# Patient Record
Sex: MALE | Race: WHITE | NOT HISPANIC OR LATINO | Employment: OTHER | URBAN - METROPOLITAN AREA
[De-identification: names, ages, dates, MRNs, and addresses within clinical notes are randomized per-mention and may not be internally consistent; named-entity substitution may affect disease eponyms.]

---

## 2017-03-09 ENCOUNTER — OFFICE VISIT (OUTPATIENT)
Dept: FAMILY MEDICINE CLINIC | Facility: CLINIC | Age: 82
End: 2017-03-09

## 2017-03-09 VITALS
HEART RATE: 75 BPM | TEMPERATURE: 97.9 F | RESPIRATION RATE: 16 BRPM | SYSTOLIC BLOOD PRESSURE: 138 MMHG | BODY MASS INDEX: 25.27 KG/M2 | DIASTOLIC BLOOD PRESSURE: 63 MMHG | HEIGHT: 67 IN | WEIGHT: 161 LBS

## 2017-03-09 DIAGNOSIS — E78.2 MIXED HYPERLIPIDEMIA: Primary | ICD-10-CM

## 2017-03-09 DIAGNOSIS — K21.9 GASTROESOPHAGEAL REFLUX DISEASE WITHOUT ESOPHAGITIS: ICD-10-CM

## 2017-03-09 DIAGNOSIS — I10 ESSENTIAL HYPERTENSION: ICD-10-CM

## 2017-03-09 PROCEDURE — 99213 OFFICE O/P EST LOW 20 MIN: CPT | Performed by: FAMILY MEDICINE

## 2017-03-09 NOTE — PROGRESS NOTES
"Subjective   Adriel Mackenzie is a 81 y.o. male.     History of Present Illness     Chief Complaint:   Chief Complaint   Patient presents with   • Hyperlipidemia     DR MCQUEEN PATIENT    • Heartburn     NO RF TODAY PER PT     • Hypertension       Adriel Mackenzie 81 y.o. male who presents today To see me for the first time (former Dr. Mcqueen pt)  for Medical Management of the below listed issues and medication refills.  he has a history of   Patient Active Problem List   Diagnosis   • GERD (gastroesophageal reflux disease)   • Hyperlipidemia   • Pleural effusion   • History of coronary artery stent placement   • Essential hypertension   .  Since the last visit, he has overall felt well.  he has been compliant with   Current Outpatient Prescriptions:   •  albuterol (PROVENTIL HFA;VENTOLIN HFA) 108 (90 BASE) MCG/ACT inhaler, Inhale 2 puffs every 4 (four) hours as needed for wheezing., Disp: , Rfl:   •  aspirin 81 MG tablet, Take 81 mg by mouth every night., Disp: , Rfl:   •  clopidogrel (PLAVIX) 75 MG tablet, Take  by mouth every other day., Disp: , Rfl:   •  esomeprazole (NexIUM) 40 MG capsule, Take 1 capsule by mouth every morning., Disp: , Rfl:   •  metoprolol succinate XL (TOPROL-XL) 25 MG 24 hr tablet, Take 25 mg by mouth daily., Disp: , Rfl:   •  oxaprozin (DAYPRO) 600 MG tablet, Take 1 tablet by mouth every morning., Disp: , Rfl:   •  rosuvastatin (CRESTOR) 5 MG tablet, Take 5 mg by mouth every evening., Disp: , Rfl:   •  SYMBICORT 80-4.5 MCG/ACT inhaler, , Disp: , Rfl: .  he denies medication side effects.    All of the chronic condition(s) listed above are stable w/o issues.      Visit Vitals   • /63   • Pulse 75   • Temp 97.9 °F (36.6 °C) (Oral)   • Resp 16   • Ht 67\" (170.2 cm)   • Wt 161 lb (73 kg)   • BMI 25.22 kg/m2       Results for orders placed or performed during the hospital encounter of 03/01/16   CBC (No diff)   Result Value Ref Range    WBC 4.90 4.50 - 10.70 K/Cumm    RBC 4.28 (L) " 4.60 - 6.00 Million    Hemoglobin 12.2 (L) 13.7 - 17.6 g/dL    Hematocrit 38.5 (L) 40.4 - 52.2 %    MCV 90.0 79.8 - 96.2 fL    MCH 28.5 27.0 - 32.7 pg    MCHC 31.7 (L) 32.6 - 36.4 g/dL    RDW 14.0 11.5 - 14.5 %    Platelets 223 140 - 500 K/Cumm   Protime-INR   Result Value Ref Range    Protime 13.5 11.7 - 14.2 Seconds    INR 1.1 0.9 - 1.1   APTT   Result Value Ref Range    PTT 31.4 22.7 - 35.4 Seconds   Comprehensive metabolic panel   Result Value Ref Range    Potassium 4.0 3.5 - 5.2 mmol/L    Chloride 104 98 - 107 mmol/L    Sodium 140 136 - 145 mmol/L    Glucose 112 (H) 65 - 99 mg/dL    BUN 18 8 - 23 mg/dL    Creatinine 1.00 0.76 - 1.27 mg/dL    CO2 24 22 - 29 mmol/L    Calcium 8.7 8.6 - 10.5 mg/dL    Total Protein 6.2 6.0 - 8.5 g/dL    Albumin 3.7 3.5 - 5.2 g/dL    AST (SGOT) 22 1 - 40 U/L    ALT (SGPT) 20 1 - 41 U/L    Alkaline Phosphatase 63 39 - 117 U/L    Total Bilirubin 0.6 0.1 - 1.2 mg/dL    Est GFR by Clearance 72 ml/min/1.732   Urinalysis   Result Value Ref Range    Color Yellow     Appearance, UA Clear     pH, UA 6.5 5.0 - 8.0    Specific Gravity, UA 1.021 1.005 - 1.030    Glucose, UA Negative Negative    Ketones, UA Negative Negative    Bilirubin, UA Negative Negative    Blood, UA Negative Negative    Protein, UA Negative Negative    Nitrite, UA Negative Negative    Leukocytes, UA Negative Negative    Urobilinogen, UA Normal Normal         The following portions of the patient's history were reviewed and updated as appropriate: allergies, current medications, past family history, past medical history, past social history, past surgical history and problem list.    Review of Systems   Constitutional: Negative for activity change, chills, fatigue and fever.   Respiratory: Negative for cough and chest tightness.    Cardiovascular: Negative for chest pain and palpitations.   Gastrointestinal: Negative for abdominal pain and nausea.   Endocrine: Negative for cold intolerance and polydipsia.    Psychiatric/Behavioral: Negative for behavioral problems and dysphoric mood.   All other systems reviewed and are negative.      Objective   Physical Exam   Constitutional: He appears well-developed and well-nourished.   Neck: Neck supple. No thyromegaly present.   Cardiovascular: Normal rate and regular rhythm.    No murmur heard.  Pulmonary/Chest: Effort normal and breath sounds normal.   Abdominal: Bowel sounds are normal.   Psychiatric: He has a normal mood and affect. His behavior is normal.   Nursing note and vitals reviewed.      Assessment/Plan   Adriel was seen today for hyperlipidemia, heartburn and hypertension.    Diagnoses and all orders for this visit:    Mixed hyperlipidemia  -     Lipid panel    Essential hypertension  -     Comprehensive metabolic panel  -     Lipid panel  -     CBC and Differential  -     TSH    Gastroesophageal reflux disease without esophagitis      Very nice gentleman who will start seeing Michelle going forward.

## 2017-03-14 LAB
ALBUMIN SERPL-MCNC: 4.2 G/DL (ref 3.5–5.2)
ALBUMIN/GLOB SERPL: 1.8 G/DL
ALP SERPL-CCNC: 71 U/L (ref 39–117)
ALT SERPL-CCNC: 22 U/L (ref 1–41)
AST SERPL-CCNC: 29 U/L (ref 1–40)
BASOPHILS # BLD AUTO: 0.03 10*3/MM3 (ref 0–0.2)
BASOPHILS NFR BLD AUTO: 0.6 % (ref 0–1.5)
BILIRUB SERPL-MCNC: 0.8 MG/DL (ref 0.1–1.2)
BUN SERPL-MCNC: 10 MG/DL (ref 8–23)
BUN/CREAT SERPL: 9.5 (ref 7–25)
CALCIUM SERPL-MCNC: 9.2 MG/DL (ref 8.6–10.5)
CHLORIDE SERPL-SCNC: 102 MMOL/L (ref 98–107)
CHOLEST SERPL-MCNC: 121 MG/DL (ref 0–200)
CO2 SERPL-SCNC: 27.7 MMOL/L (ref 22–29)
CREAT SERPL-MCNC: 1.05 MG/DL (ref 0.76–1.27)
EOSINOPHIL # BLD AUTO: 0.44 10*3/MM3 (ref 0–0.7)
EOSINOPHIL NFR BLD AUTO: 9.2 % (ref 0.3–6.2)
ERYTHROCYTE [DISTWIDTH] IN BLOOD BY AUTOMATED COUNT: 13.8 % (ref 11.5–14.5)
GLOBULIN SER CALC-MCNC: 2.3 GM/DL
GLUCOSE SERPL-MCNC: 104 MG/DL (ref 65–99)
HCT VFR BLD AUTO: 42 % (ref 40.4–52.2)
HDLC SERPL-MCNC: 53 MG/DL (ref 40–60)
HGB BLD-MCNC: 13.5 G/DL (ref 13.7–17.6)
IMM GRANULOCYTES # BLD: 0 10*3/MM3 (ref 0–0.03)
IMM GRANULOCYTES NFR BLD: 0 % (ref 0–0.5)
LDLC SERPL CALC-MCNC: 55 MG/DL (ref 0–100)
LYMPHOCYTES # BLD AUTO: 1.39 10*3/MM3 (ref 0.9–4.8)
LYMPHOCYTES NFR BLD AUTO: 29.2 % (ref 19.6–45.3)
MCH RBC QN AUTO: 28.5 PG (ref 27–32.7)
MCHC RBC AUTO-ENTMCNC: 32.1 G/DL (ref 32.6–36.4)
MCV RBC AUTO: 88.8 FL (ref 79.8–96.2)
MONOCYTES # BLD AUTO: 0.4 10*3/MM3 (ref 0.2–1.2)
MONOCYTES NFR BLD AUTO: 8.4 % (ref 5–12)
NEUTROPHILS # BLD AUTO: 2.5 10*3/MM3 (ref 1.9–8.1)
NEUTROPHILS NFR BLD AUTO: 52.6 % (ref 42.7–76)
PLATELET # BLD AUTO: 254 10*3/MM3 (ref 140–500)
POTASSIUM SERPL-SCNC: 4.4 MMOL/L (ref 3.5–5.2)
PROT SERPL-MCNC: 6.5 G/DL (ref 6–8.5)
RBC # BLD AUTO: 4.73 10*6/MM3 (ref 4.6–6)
SODIUM SERPL-SCNC: 142 MMOL/L (ref 136–145)
TRIGL SERPL-MCNC: 67 MG/DL (ref 0–150)
TSH SERPL DL<=0.005 MIU/L-ACNC: 1.86 MIU/ML (ref 0.27–4.2)
VLDLC SERPL CALC-MCNC: 13.4 MG/DL (ref 5–40)
WBC # BLD AUTO: 4.76 10*3/MM3 (ref 4.5–10.7)

## 2017-04-04 ENCOUNTER — OFFICE VISIT (OUTPATIENT)
Dept: FAMILY MEDICINE CLINIC | Facility: CLINIC | Age: 82
End: 2017-04-04

## 2017-04-04 VITALS
HEIGHT: 67 IN | HEART RATE: 74 BPM | OXYGEN SATURATION: 95 % | SYSTOLIC BLOOD PRESSURE: 130 MMHG | TEMPERATURE: 97.7 F | BODY MASS INDEX: 25.43 KG/M2 | RESPIRATION RATE: 16 BRPM | DIASTOLIC BLOOD PRESSURE: 60 MMHG | WEIGHT: 162 LBS

## 2017-04-04 DIAGNOSIS — J42 CHRONIC BRONCHITIS, UNSPECIFIED CHRONIC BRONCHITIS TYPE (HCC): Primary | ICD-10-CM

## 2017-04-04 DIAGNOSIS — E78.2 MIXED HYPERLIPIDEMIA: ICD-10-CM

## 2017-04-04 PROCEDURE — 99213 OFFICE O/P EST LOW 20 MIN: CPT | Performed by: NURSE PRACTITIONER

## 2017-04-04 RX ORDER — ATORVASTATIN CALCIUM 20 MG/1
20 TABLET, FILM COATED ORAL DAILY
Qty: 90 TABLET | Refills: 1 | Status: SHIPPED | OUTPATIENT
Start: 2017-04-04 | End: 2018-04-16

## 2017-04-04 RX ORDER — BUDESONIDE AND FORMOTEROL FUMARATE DIHYDRATE 80; 4.5 UG/1; UG/1
2 AEROSOL RESPIRATORY (INHALATION)
Qty: 1 INHALER | Refills: 5 | Status: SHIPPED | OUTPATIENT
Start: 2017-04-04 | End: 2017-05-17 | Stop reason: SDUPTHER

## 2017-04-04 RX ORDER — ALBUTEROL SULFATE 90 UG/1
2 AEROSOL, METERED RESPIRATORY (INHALATION) EVERY 4 HOURS PRN
Qty: 1 INHALER | Refills: 6 | Status: SHIPPED | OUTPATIENT
Start: 2017-04-04

## 2017-04-04 NOTE — PROGRESS NOTES
"Subjective   Adriel Mackenzie is a 81 y.o. male.     History of Present Illness   Chief Complaint:   Chief Complaint   Patient presents with   • lab results       Adriel Mackenzie 81 y.o. male who presents today for Medical Management of the below listed issues and medication refills.  he has a history of   Patient Active Problem List   Diagnosis   • GERD (gastroesophageal reflux disease)   • Hyperlipidemia   • Pleural effusion   • History of coronary artery stent placement   • Essential hypertension   .  Since the last visit, he has overall felt well.  he has been compliant with   Current Outpatient Prescriptions:   •  albuterol (PROVENTIL HFA;VENTOLIN HFA) 108 (90 BASE) MCG/ACT inhaler, Inhale 2 puffs Every 4 (Four) Hours As Needed for Wheezing., Disp: 1 inhaler, Rfl: 6  •  aspirin 81 MG tablet, Take 81 mg by mouth every night., Disp: , Rfl:   •  atorvastatin (LIPITOR) 20 MG tablet, Take 1 tablet by mouth Daily., Disp: 90 tablet, Rfl: 1  •  budesonide-formoterol (SYMBICORT) 80-4.5 MCG/ACT inhaler, Inhale 2 puffs 2 (Two) Times a Day., Disp: 1 inhaler, Rfl: 5  •  clopidogrel (PLAVIX) 75 MG tablet, Take  by mouth every other day., Disp: , Rfl:   •  esomeprazole (NexIUM) 40 MG capsule, Take 1 capsule by mouth every morning., Disp: , Rfl:   •  ipratropium-albuterol (COMBIVENT RESPIMAT)  MCG/ACT inhaler, Inhale 1 puff 4 (Four) Times a Day As Needed for Wheezing., Disp: 4 g, Rfl: 5  •  metoprolol succinate XL (TOPROL-XL) 25 MG 24 hr tablet, Take 25 mg by mouth daily., Disp: , Rfl:   •  oxaprozin (DAYPRO) 600 MG tablet, Take 1 tablet by mouth every morning., Disp: , Rfl:   •  SYMBICORT 80-4.5 MCG/ACT inhaler, , Disp: , Rfl: .  he denies medication side effects.    All of the chronic condition(s) listed above are stable w/o issues.    /60  Pulse 74  Temp 97.7 °F (36.5 °C)  Resp 16  Ht 67\" (170.2 cm)  Wt 162 lb (73.5 kg)  SpO2 95%  BMI 25.37 kg/m2    Results for orders placed or performed in visit " on 03/09/17   Comprehensive metabolic panel   Result Value Ref Range    Glucose 104 (H) 65 - 99 mg/dL    BUN 10 8 - 23 mg/dL    Creatinine 1.05 0.76 - 1.27 mg/dL    eGFR Non African Am 68 >60 mL/min/1.73    eGFR African Am 82 >60 mL/min/1.73    BUN/Creatinine Ratio 9.5 7.0 - 25.0    Sodium 142 136 - 145 mmol/L    Potassium 4.4 3.5 - 5.2 mmol/L    Chloride 102 98 - 107 mmol/L    Total CO2 27.7 22.0 - 29.0 mmol/L    Calcium 9.2 8.6 - 10.5 mg/dL    Total Protein 6.5 6.0 - 8.5 g/dL    Albumin 4.20 3.50 - 5.20 g/dL    Globulin 2.3 gm/dL    A/G Ratio 1.8 g/dL    Total Bilirubin 0.8 0.1 - 1.2 mg/dL    Alkaline Phosphatase 71 39 - 117 U/L    AST (SGOT) 29 1 - 40 U/L    ALT (SGPT) 22 1 - 41 U/L   Lipid panel   Result Value Ref Range    Total Cholesterol 121 0 - 200 mg/dL    Triglycerides 67 0 - 150 mg/dL    HDL Cholesterol 53 40 - 60 mg/dL    VLDL Cholesterol 13.4 5 - 40 mg/dL    LDL Cholesterol  55 0 - 100 mg/dL   TSH   Result Value Ref Range    TSH 1.860 0.270 - 4.200 mIU/mL   CBC and Differential   Result Value Ref Range    WBC 4.76 4.50 - 10.70 10*3/mm3    RBC 4.73 4.60 - 6.00 10*6/mm3    Hemoglobin 13.5 (L) 13.7 - 17.6 g/dL    Hematocrit 42.0 40.4 - 52.2 %    MCV 88.8 79.8 - 96.2 fL    MCH 28.5 27.0 - 32.7 pg    MCHC 32.1 (L) 32.6 - 36.4 g/dL    RDW 13.8 11.5 - 14.5 %    Platelets 254 140 - 500 10*3/mm3    Neutrophil Rel % 52.6 42.7 - 76.0 %    Lymphocyte Rel % 29.2 19.6 - 45.3 %    Monocyte Rel % 8.4 5.0 - 12.0 %    Eosinophil Rel % 9.2 (H) 0.3 - 6.2 %    Basophil Rel % 0.6 0.0 - 1.5 %    Neutrophils Absolute 2.50 1.90 - 8.10 10*3/mm3    Lymphocytes Absolute 1.39 0.90 - 4.80 10*3/mm3    Monocytes Absolute 0.40 0.20 - 1.20 10*3/mm3    Eosinophils Absolute 0.44 0.00 - 0.70 10*3/mm3    Basophils Absolute 0.03 0.00 - 0.20 10*3/mm3    Immature Granulocyte Rel % 0.0 0.0 - 0.5 %    Immature Grans Absolute 0.00 0.00 - 0.03 10*3/mm3     Patient sees pulmonologist for management of COPD. States he was seen last month but did not  get medications refilled.    He has coronary artery stent and sees cardiology regularly.   The following portions of the patient's history were reviewed and updated as appropriate: allergies, current medications, past family history, past medical history, past social history, past surgical history and problem list.    Review of Systems   Constitutional: Negative for fatigue.   Respiratory: Negative for cough and shortness of breath.    Cardiovascular: Negative for chest pain and palpitations.   Skin: Negative for rash.   Psychiatric/Behavioral: Negative for dysphoric mood and sleep disturbance. The patient is not nervous/anxious.        Objective   Physical Exam   Constitutional: He is oriented to person, place, and time. He appears well-developed and well-nourished.   Neck: Carotid bruit is not present.   Cardiovascular: Normal rate and regular rhythm.    Pulmonary/Chest: Effort normal and breath sounds normal.   Neurological: He is oriented to person, place, and time.   Skin: Skin is warm and dry.   Psychiatric: He has a normal mood and affect. His behavior is normal. Judgment and thought content normal.   Nursing note and vitals reviewed.      Assessment/Plan   Adriel was seen today for lab results.    Diagnoses and all orders for this visit:    Chronic bronchitis, unspecified chronic bronchitis type  -     albuterol (PROVENTIL HFA;VENTOLIN HFA) 108 (90 BASE) MCG/ACT inhaler; Inhale 2 puffs Every 4 (Four) Hours As Needed for Wheezing.  -     ipratropium-albuterol (COMBIVENT RESPIMAT)  MCG/ACT inhaler; Inhale 1 puff 4 (Four) Times a Day As Needed for Wheezing.  -     budesonide-formoterol (SYMBICORT) 80-4.5 MCG/ACT inhaler; Inhale 2 puffs 2 (Two) Times a Day.    Mixed hyperlipidemia  -     atorvastatin (LIPITOR) 20 MG tablet; Take 1 tablet by mouth Daily.

## 2017-04-18 ENCOUNTER — OFFICE VISIT (OUTPATIENT)
Dept: FAMILY MEDICINE CLINIC | Facility: CLINIC | Age: 82
End: 2017-04-18

## 2017-04-18 VITALS
TEMPERATURE: 98.2 F | SYSTOLIC BLOOD PRESSURE: 146 MMHG | RESPIRATION RATE: 18 BRPM | OXYGEN SATURATION: 98 % | WEIGHT: 162 LBS | DIASTOLIC BLOOD PRESSURE: 68 MMHG | BODY MASS INDEX: 25.43 KG/M2 | HEIGHT: 67 IN | HEART RATE: 75 BPM

## 2017-04-18 DIAGNOSIS — J06.9 ACUTE URI: ICD-10-CM

## 2017-04-18 DIAGNOSIS — J02.9 SORE THROAT: Primary | ICD-10-CM

## 2017-04-18 DIAGNOSIS — I10 ESSENTIAL HYPERTENSION: ICD-10-CM

## 2017-04-18 LAB
EXPIRATION DATE: NORMAL
INTERNAL CONTROL: NORMAL
Lab: NORMAL
S PYO AG THROAT QL: NEGATIVE

## 2017-04-18 PROCEDURE — 87880 STREP A ASSAY W/OPTIC: CPT | Performed by: NURSE PRACTITIONER

## 2017-04-18 PROCEDURE — 99214 OFFICE O/P EST MOD 30 MIN: CPT | Performed by: NURSE PRACTITIONER

## 2017-04-18 RX ORDER — AMOXICILLIN AND CLAVULANATE POTASSIUM 875; 125 MG/1; MG/1
1 TABLET, FILM COATED ORAL 2 TIMES DAILY
Qty: 14 TABLET | Refills: 0 | Status: SHIPPED | OUTPATIENT
Start: 2017-04-18 | End: 2017-04-25

## 2017-04-18 NOTE — PROGRESS NOTES
Subjective   Adriel Mackenzie is a 81 y.o. male.     History of Present Illness   Adriel aMckenzie 81 y.o. male who presents for evaluation of upper respiratory congestion, sore throat. Symptoms include sore throat and productive cough.  Onset of symptoms was 2 days ago, gradually worsening since that time. Patient denies fever.   Evaluation to date: none Treatment to date:  none.       Patient notes blood pressure has been elevated in 150s systolic when he has checked it at the gym.  Initial blood pressure elevated today.  Repeat blood pressure 146/68.  The following portions of the patient's history were reviewed and updated as appropriate: allergies, current medications, past family history, past medical history, past social history, past surgical history and problem list.    Review of Systems   Constitutional: Negative for chills, fatigue and fever.   HENT: Positive for sore throat. Negative for ear pain.    Respiratory: Positive for cough. Negative for shortness of breath.    Cardiovascular: Negative for chest pain and palpitations.   Skin: Negative for rash.   Psychiatric/Behavioral: Negative for dysphoric mood and sleep disturbance. The patient is not nervous/anxious.        Objective   Physical Exam   Constitutional: He is oriented to person, place, and time. He appears well-developed and well-nourished.   HENT:   Right Ear: Tympanic membrane, external ear and ear canal normal.   Left Ear: Tympanic membrane, external ear and ear canal normal.   Mouth/Throat: Uvula is midline and oropharynx is clear and moist.   Cardiovascular: Normal rate and regular rhythm.    Pulmonary/Chest: Effort normal and breath sounds normal.   Neurological: He is oriented to person, place, and time.   Skin: Skin is warm and dry.   Psychiatric: He has a normal mood and affect. His behavior is normal. Judgment and thought content normal.   Nursing note and vitals reviewed.      Assessment/Plan   Adriel was seen today for sore  throat.    Diagnoses and all orders for this visit:    Sore throat  -     POCT rapid strep A  -     amoxicillin-clavulanate (AUGMENTIN) 875-125 MG per tablet; Take 1 tablet by mouth 2 (Two) Times a Day for 7 days.    Acute URI  -     amoxicillin-clavulanate (AUGMENTIN) 875-125 MG per tablet; Take 1 tablet by mouth 2 (Two) Times a Day for 7 days.    Essential hypertension          Patient to increase metoprolol to 50 mg daily and f/u in one week.

## 2017-04-20 ENCOUNTER — OFFICE VISIT (OUTPATIENT)
Dept: FAMILY MEDICINE CLINIC | Facility: CLINIC | Age: 82
End: 2017-04-20

## 2017-04-20 VITALS
OXYGEN SATURATION: 94 % | DIASTOLIC BLOOD PRESSURE: 76 MMHG | BODY MASS INDEX: 25.43 KG/M2 | HEIGHT: 67 IN | SYSTOLIC BLOOD PRESSURE: 142 MMHG | HEART RATE: 79 BPM | RESPIRATION RATE: 18 BRPM | WEIGHT: 162 LBS | TEMPERATURE: 98.7 F

## 2017-04-20 DIAGNOSIS — J18.9 PNEUMONIA DUE TO INFECTIOUS ORGANISM, UNSPECIFIED LATERALITY, UNSPECIFIED PART OF LUNG: Primary | ICD-10-CM

## 2017-04-20 PROCEDURE — 99213 OFFICE O/P EST LOW 20 MIN: CPT | Performed by: NURSE PRACTITIONER

## 2017-04-20 RX ORDER — DOXYCYCLINE 100 MG/1
100 TABLET ORAL 2 TIMES DAILY
Qty: 20 TABLET | Refills: 0 | Status: SHIPPED | OUTPATIENT
Start: 2017-04-20 | End: 2017-11-07

## 2017-04-20 NOTE — PROGRESS NOTES
Subjective   Adriel Mackenzie is a 81 y.o. male.     History of Present Illness   Adriel Mackenzie 81 y.o. male who presents for f/u evaluation of URI. Symptoms include sore throat, productive cough and chills.  Onset of symptoms was several days ago, gradually worsening since that time. Patient denies fever.   Evaluation to date: seen recently by me Treatment to date:  Augmentin. Reports feeling worse since 2 days ago.     The following portions of the patient's history were reviewed and updated as appropriate: allergies, current medications, past family history, past medical history, past social history, past surgical history and problem list.    Review of Systems   Constitutional: Positive for chills. Negative for fever.   HENT: Negative for congestion.    Respiratory: Positive for cough, chest tightness and shortness of breath.        Objective   Physical Exam   Constitutional: He is oriented to person, place, and time. He appears well-developed and well-nourished.   Cardiovascular: Normal rate and regular rhythm.    Pulmonary/Chest: Effort normal. He has rales in the right lower field.   Neurological: He is oriented to person, place, and time.   Skin: Skin is warm and dry.   Psychiatric: He has a normal mood and affect. His behavior is normal. Judgment and thought content normal.   Nursing note and vitals reviewed.      Assessment/Plan   Problems Addressed this Visit     None      Visit Diagnoses     Pneumonia due to infectious organism, unspecified laterality, unspecified part of lung    -  Primary    Relevant Medications    doxycycline (ADOXA) 100 MG tablet

## 2017-04-25 ENCOUNTER — OFFICE VISIT (OUTPATIENT)
Dept: FAMILY MEDICINE CLINIC | Facility: CLINIC | Age: 82
End: 2017-04-25

## 2017-04-25 VITALS
WEIGHT: 162 LBS | BODY MASS INDEX: 25.43 KG/M2 | RESPIRATION RATE: 18 BRPM | HEIGHT: 67 IN | DIASTOLIC BLOOD PRESSURE: 80 MMHG | OXYGEN SATURATION: 95 % | HEART RATE: 80 BPM | SYSTOLIC BLOOD PRESSURE: 160 MMHG | TEMPERATURE: 97.8 F

## 2017-04-25 DIAGNOSIS — R93.89 ABNORMAL CHEST X-RAY: ICD-10-CM

## 2017-04-25 DIAGNOSIS — J18.9 PNEUMONIA DUE TO INFECTIOUS ORGANISM, UNSPECIFIED LATERALITY, UNSPECIFIED PART OF LUNG: Primary | ICD-10-CM

## 2017-04-25 PROCEDURE — 99214 OFFICE O/P EST MOD 30 MIN: CPT | Performed by: NURSE PRACTITIONER

## 2017-04-25 PROCEDURE — 71020 XR CHEST PA AND LATERAL: CPT | Performed by: NURSE PRACTITIONER

## 2017-04-25 RX ORDER — ROSUVASTATIN CALCIUM 5 MG/1
TABLET, COATED ORAL
Refills: 2 | COMMUNITY
Start: 2017-04-20

## 2017-04-25 NOTE — PROGRESS NOTES
Subjective   Adriel Mackenzie is a 81 y.o. male.     History of Present Illness   Patient presents to office for f/u of pneumonia.  At last visit, he had doxycycline added to augmentin.  He reports some improvement but is still unable to sleep at night due to cough.  He still has 5 days of doxycycline left.  He does have pulmonologist he sees every 6 months, but is not due for f/u for another 4 months. He had chest xray a year ago at [facility].   The following portions of the patient's history were reviewed and updated as appropriate: allergies, current medications, past family history, past medical history, past social history, past surgical history and problem list.    Review of Systems   Constitutional: Negative for chills and fever.   HENT: Negative for congestion.    Respiratory: Positive for cough, chest tightness and shortness of breath.    Cardiovascular: Negative for chest pain.       Objective   Physical Exam   Constitutional: He is oriented to person, place, and time. He appears well-developed and well-nourished.   Cardiovascular: Normal rate and regular rhythm.    Pulmonary/Chest: Effort normal. He has rales in the right lower field.   Rales improved since last visit   Neurological: He is oriented to person, place, and time.   Skin: Skin is warm and dry.   Psychiatric: He has a normal mood and affect. His behavior is normal. Judgment and thought content normal.   Nursing note and vitals reviewed.      Assessment/Plan   Adriel was seen today for cough.    Diagnoses and all orders for this visit:    Pneumonia due to infectious organism, unspecified laterality, unspecified part of lung  -     XR Chest PA & Lateral (In Office)  -     CT Chest With Contrast    Abnormal chest x-ray        Chest xray showed opacity to LLL and has vertical linear opacity extending from left upper to lower lobe. Similar to previous xray from 3/2016.  Will order CT scan.

## 2017-05-01 ENCOUNTER — OFFICE VISIT (OUTPATIENT)
Dept: FAMILY MEDICINE CLINIC | Facility: CLINIC | Age: 82
End: 2017-05-01

## 2017-05-01 VITALS
DIASTOLIC BLOOD PRESSURE: 80 MMHG | WEIGHT: 160 LBS | TEMPERATURE: 97.8 F | SYSTOLIC BLOOD PRESSURE: 152 MMHG | HEART RATE: 61 BPM | BODY MASS INDEX: 25.11 KG/M2 | HEIGHT: 67 IN | RESPIRATION RATE: 16 BRPM | OXYGEN SATURATION: 96 %

## 2017-05-01 DIAGNOSIS — J40 BRONCHITIS: Primary | ICD-10-CM

## 2017-05-01 PROCEDURE — 99213 OFFICE O/P EST LOW 20 MIN: CPT | Performed by: NURSE PRACTITIONER

## 2017-05-01 RX ORDER — ICOSAPENT ETHYL 1000 MG/1
CAPSULE ORAL
COMMUNITY
Start: 2017-04-26 | End: 2018-08-18

## 2017-05-01 RX ORDER — PREDNISONE 20 MG/1
20 TABLET ORAL 2 TIMES DAILY
Qty: 14 TABLET | Refills: 0 | Status: SHIPPED | OUTPATIENT
Start: 2017-05-01 | End: 2017-05-08

## 2017-05-17 ENCOUNTER — TELEPHONE (OUTPATIENT)
Dept: FAMILY MEDICINE CLINIC | Facility: CLINIC | Age: 82
End: 2017-05-17

## 2017-05-17 DIAGNOSIS — J42 CHRONIC BRONCHITIS, UNSPECIFIED CHRONIC BRONCHITIS TYPE (HCC): ICD-10-CM

## 2017-05-17 RX ORDER — BUDESONIDE AND FORMOTEROL FUMARATE DIHYDRATE 80; 4.5 UG/1; UG/1
2 AEROSOL RESPIRATORY (INHALATION)
Qty: 1 INHALER | Refills: 5 | Status: SHIPPED | OUTPATIENT
Start: 2017-05-17

## 2017-11-07 ENCOUNTER — OFFICE VISIT (OUTPATIENT)
Dept: FAMILY MEDICINE CLINIC | Facility: CLINIC | Age: 82
End: 2017-11-07

## 2017-11-07 VITALS
SYSTOLIC BLOOD PRESSURE: 157 MMHG | TEMPERATURE: 98.4 F | RESPIRATION RATE: 16 BRPM | BODY MASS INDEX: 25.43 KG/M2 | HEIGHT: 67 IN | HEART RATE: 68 BPM | DIASTOLIC BLOOD PRESSURE: 70 MMHG | WEIGHT: 162 LBS

## 2017-11-07 DIAGNOSIS — J40 BRONCHITIS: Primary | ICD-10-CM

## 2017-11-07 PROCEDURE — 99213 OFFICE O/P EST LOW 20 MIN: CPT | Performed by: NURSE PRACTITIONER

## 2017-11-07 RX ORDER — LEVOFLOXACIN 500 MG/1
500 TABLET, FILM COATED ORAL DAILY
Qty: 10 TABLET | Refills: 0 | Status: SHIPPED | OUTPATIENT
Start: 2017-11-07 | End: 2017-11-16

## 2017-11-07 RX ORDER — PREDNISONE 20 MG/1
20 TABLET ORAL 2 TIMES DAILY
Qty: 10 TABLET | Refills: 0 | Status: SHIPPED | OUTPATIENT
Start: 2017-11-07 | End: 2017-11-12

## 2017-11-07 RX ORDER — BENZONATATE 200 MG/1
200 CAPSULE ORAL 3 TIMES DAILY PRN
Qty: 30 CAPSULE | Refills: 0 | Status: SHIPPED | OUTPATIENT
Start: 2017-11-07 | End: 2017-11-16

## 2017-11-07 NOTE — PROGRESS NOTES
Subjective   Adriel Mackenzie is a 82 y.o. male.     History of Present Illness   Adriel Mackenzie 82 y.o. male who presents for evaluation of upper respiratory congestion. Symptoms include congestion, sore throat, post nasal drip, productive cough and chills.  Onset of symptoms was 5 days ago, gradually worsening since that time. Patient denies fever.   Evaluation to date: none Treatment to date:  OTC oral decongestants, OTC cough suppressant and Advil.     The following portions of the patient's history were reviewed and updated as appropriate: allergies, current medications, past family history, past medical history, past social history, past surgical history and problem list.    Review of Systems   Constitutional: Negative for chills and fever.   HENT: Positive for congestion, postnasal drip, sinus pain, sinus pressure and sore throat.    Respiratory: Positive for cough. Negative for chest tightness and shortness of breath.        Objective   Physical Exam   Constitutional: He is oriented to person, place, and time. He appears well-developed and well-nourished.   Cardiovascular: Normal rate and regular rhythm.    Pulmonary/Chest: Effort normal. He has decreased breath sounds in the right lower field and the left lower field. He has wheezes in the right lower field.   Neurological: He is oriented to person, place, and time.   Skin: Skin is warm and dry.   Psychiatric: He has a normal mood and affect. His behavior is normal. Judgment and thought content normal.   Nursing note and vitals reviewed.      Assessment/Plan   Adriel was seen today for sore throat, cough and uri.    Diagnoses and all orders for this visit:    Bronchitis  -     levoFLOXacin (LEVAQUIN) 500 MG tablet; Take 1 tablet by mouth Daily.  -     predniSONE (DELTASONE) 20 MG tablet; Take 1 tablet by mouth 2 (Two) Times a Day for 5 days.  -     benzonatate (TESSALON) 200 MG capsule; Take 1 capsule by mouth 3 (Three) Times a Day As Needed for  Cough.

## 2017-11-16 ENCOUNTER — OFFICE VISIT (OUTPATIENT)
Dept: FAMILY MEDICINE CLINIC | Facility: CLINIC | Age: 82
End: 2017-11-16

## 2017-11-16 VITALS
BODY MASS INDEX: 24.64 KG/M2 | HEART RATE: 76 BPM | DIASTOLIC BLOOD PRESSURE: 86 MMHG | WEIGHT: 157 LBS | HEIGHT: 67 IN | RESPIRATION RATE: 16 BRPM | TEMPERATURE: 98 F | OXYGEN SATURATION: 99 % | SYSTOLIC BLOOD PRESSURE: 130 MMHG

## 2017-11-16 DIAGNOSIS — B37.0 THRUSH: Primary | ICD-10-CM

## 2017-11-16 DIAGNOSIS — J40 BRONCHITIS: ICD-10-CM

## 2017-11-16 PROCEDURE — 99213 OFFICE O/P EST LOW 20 MIN: CPT | Performed by: NURSE PRACTITIONER

## 2017-11-16 RX ORDER — LEVOFLOXACIN 500 MG/1
500 TABLET, FILM COATED ORAL DAILY
Qty: 7 TABLET | Refills: 0 | Status: SHIPPED | OUTPATIENT
Start: 2017-11-16 | End: 2017-12-01 | Stop reason: SDUPTHER

## 2017-11-16 NOTE — PROGRESS NOTES
Subjective   Adriel Mackenzie is a 82 y.o. male.     History of Present Illness   Adriel Mackenzie 82 y.o. male who presents for f/u evaluation of bronchitis. He finished the levaquin and states symptoms improved but started to return when he finished antibiotic.  He reports sore throat and nonproductive cough.    The following portions of the patient's history were reviewed and updated as appropriate: allergies, current medications, past family history, past medical history, past social history, past surgical history and problem list.    Review of Systems   Constitutional: Negative for chills and fever.   HENT: Positive for sore throat and trouble swallowing.    Respiratory: Positive for cough.        Objective   Physical Exam   Constitutional: He is oriented to person, place, and time. He appears well-developed and well-nourished.   HENT:   Thick, creamy white coating to tongue   Cardiovascular: Normal rate and regular rhythm.    Pulmonary/Chest: Effort normal. He has decreased breath sounds in the right lower field and the left lower field.   Neurological: He is oriented to person, place, and time.   Skin: Skin is warm and dry.   Psychiatric: He has a normal mood and affect. His behavior is normal. Judgment and thought content normal.   Nursing note and vitals reviewed.      Assessment/Plan   Adriel was seen today for sore throat and cough.    Diagnoses and all orders for this visit:    Thrush  -     nystatin (MYCOSTATIN) 263898 UNIT/ML suspension; Swish and swallow 5 mL 4 (Four) Times a Day.    Bronchitis  -     levoFLOXacin (LEVAQUIN) 500 MG tablet; Take 1 tablet by mouth Daily.

## 2017-12-01 ENCOUNTER — OFFICE VISIT (OUTPATIENT)
Dept: FAMILY MEDICINE CLINIC | Facility: CLINIC | Age: 82
End: 2017-12-01

## 2017-12-01 VITALS
OXYGEN SATURATION: 95 % | RESPIRATION RATE: 18 BRPM | HEART RATE: 65 BPM | WEIGHT: 161 LBS | HEIGHT: 67 IN | SYSTOLIC BLOOD PRESSURE: 150 MMHG | DIASTOLIC BLOOD PRESSURE: 86 MMHG | TEMPERATURE: 97.8 F | BODY MASS INDEX: 25.27 KG/M2

## 2017-12-01 DIAGNOSIS — J06.9 ACUTE URI: Primary | ICD-10-CM

## 2017-12-01 PROCEDURE — 99213 OFFICE O/P EST LOW 20 MIN: CPT | Performed by: NURSE PRACTITIONER

## 2017-12-01 RX ORDER — LEVOFLOXACIN 500 MG/1
500 TABLET, FILM COATED ORAL DAILY
Qty: 10 TABLET | Refills: 0 | Status: SHIPPED | OUTPATIENT
Start: 2017-12-01 | End: 2018-04-16

## 2017-12-01 NOTE — PROGRESS NOTES
Subjective   Adriel Mackenzie is a 82 y.o. male.     History of Present Illness   Adriel Mackenzie 82 y.o. male who presents for evaluation of upper respiratory congestion. Symptoms include congestion, sore throat, swollen glands and dry cough.  Onset of symptoms was a few days ago, gradually improving since that time. Patient denies shortness of breath, wheezing, fever.   Evaluation to date: none Treatment to date:  none.     The following portions of the patient's history were reviewed and updated as appropriate: allergies, current medications, past family history, past medical history, past social history, past surgical history and problem list.    Review of Systems   Constitutional: Negative for chills and fever.   HENT: Positive for congestion, postnasal drip, rhinorrhea and sore throat. Negative for sinus pressure.    Respiratory: Positive for cough.        Objective   Physical Exam   Constitutional: He is oriented to person, place, and time. He appears well-developed and well-nourished.   HENT:   Right Ear: Tympanic membrane, external ear and ear canal normal.   Left Ear: Tympanic membrane, external ear and ear canal normal.   Nose: Right sinus exhibits no maxillary sinus tenderness and no frontal sinus tenderness. Left sinus exhibits no maxillary sinus tenderness and no frontal sinus tenderness.   Mouth/Throat: Uvula is midline and oropharynx is clear and moist.   Cardiovascular: Normal rate and regular rhythm.    Pulmonary/Chest: Effort normal and breath sounds normal.   Neurological: He is oriented to person, place, and time.   Skin: Skin is warm and dry.   Psychiatric: He has a normal mood and affect. His behavior is normal. Judgment and thought content normal.   Nursing note and vitals reviewed.      Assessment/Plan   Adriel was seen today for sore throat.    Diagnoses and all orders for this visit:    Acute URI  -     levoFLOXacin (LEVAQUIN) 500 MG tablet; Take 1 tablet by mouth  Daily.        Patient not to take antibiotic unless cough and chest congestion worsens.

## 2018-04-16 ENCOUNTER — OFFICE VISIT (OUTPATIENT)
Dept: FAMILY MEDICINE CLINIC | Facility: CLINIC | Age: 83
End: 2018-04-16

## 2018-04-16 VITALS
BODY MASS INDEX: 25.11 KG/M2 | RESPIRATION RATE: 16 BRPM | SYSTOLIC BLOOD PRESSURE: 146 MMHG | HEART RATE: 64 BPM | HEIGHT: 67 IN | TEMPERATURE: 98.1 F | OXYGEN SATURATION: 96 % | WEIGHT: 160 LBS | DIASTOLIC BLOOD PRESSURE: 56 MMHG

## 2018-04-16 DIAGNOSIS — J40 BRONCHITIS: Primary | ICD-10-CM

## 2018-04-16 PROCEDURE — 99213 OFFICE O/P EST LOW 20 MIN: CPT | Performed by: NURSE PRACTITIONER

## 2018-04-16 RX ORDER — AMLODIPINE BESYLATE 5 MG/1
5 TABLET ORAL
COMMUNITY
Start: 2018-03-23

## 2018-04-16 RX ORDER — PREDNISONE 20 MG/1
TABLET ORAL
Qty: 20 TABLET | Refills: 0 | Status: SHIPPED | OUTPATIENT
Start: 2018-04-16 | End: 2018-08-18

## 2018-04-16 RX ORDER — LEVOFLOXACIN 500 MG/1
500 TABLET, FILM COATED ORAL DAILY
Qty: 14 TABLET | Refills: 0 | Status: SHIPPED | OUTPATIENT
Start: 2018-04-16 | End: 2018-04-26

## 2018-04-16 NOTE — PROGRESS NOTES
Subjective   Adriel Mackenzie is a 82 y.o. male.     History of Present Illness   Adriel Mackenzie 82 y.o. male who presents for evaluation of upper respiratory congestion, acute bronchitis. Symptoms include congestion, nasal blockage and productive cough, wheezing and shortness of breath.  Onset of symptoms was 5 days ago, gradually worsening since that time. Patient denies fever.   Evaluation to date: none Treatment to date:  leftover levaquin which has helped.  Only had 3 doses.     The following portions of the patient's history were reviewed and updated as appropriate: allergies, current medications, past family history, past medical history, past social history, past surgical history and problem list.    Review of Systems   Constitutional: Positive for fatigue. Negative for chills and fever.   HENT: Positive for congestion and rhinorrhea.    Respiratory: Positive for cough, chest tightness, shortness of breath and wheezing.        Objective   Physical Exam   Constitutional: He is oriented to person, place, and time. He appears well-developed and well-nourished.   HENT:   Right Ear: Tympanic membrane, external ear and ear canal normal.   Left Ear: Tympanic membrane, external ear and ear canal normal.   Nose: Right sinus exhibits no maxillary sinus tenderness and no frontal sinus tenderness. Left sinus exhibits no maxillary sinus tenderness and no frontal sinus tenderness.   Mouth/Throat: Uvula is midline and oropharynx is clear and moist.   Cardiovascular: Normal rate and regular rhythm.    Pulmonary/Chest: Effort normal. He has wheezes in the right upper field, the right lower field, the left upper field and the left lower field. He has rhonchi in the right upper field, the right lower field, the left upper field and the left lower field.   Neurological: He is oriented to person, place, and time.   Skin: Skin is warm and dry.   Psychiatric: He has a normal mood and affect. His behavior is normal.  Judgment and thought content normal.   Nursing note and vitals reviewed.      Assessment/Plan   Adriel was seen today for cough and uri.    Diagnoses and all orders for this visit:    Bronchitis  -     levoFLOXacin (LEVAQUIN) 500 MG tablet; Take 1 tablet by mouth Daily for 10 days.  -     predniSONE (DELTASONE) 20 MG tablet; Take 3 tabs QDPC x 3 days then 2 tabs QDPC x 4 days then 1 tab QDPC x 3 days

## 2018-08-16 ENCOUNTER — OFFICE VISIT (OUTPATIENT)
Dept: FAMILY MEDICINE CLINIC | Facility: CLINIC | Age: 83
End: 2018-08-16

## 2018-08-16 VITALS
HEIGHT: 67 IN | WEIGHT: 158 LBS | DIASTOLIC BLOOD PRESSURE: 62 MMHG | TEMPERATURE: 97.5 F | OXYGEN SATURATION: 97 % | HEART RATE: 60 BPM | RESPIRATION RATE: 16 BRPM | BODY MASS INDEX: 24.8 KG/M2 | SYSTOLIC BLOOD PRESSURE: 140 MMHG

## 2018-08-16 DIAGNOSIS — J40 BRONCHITIS: Primary | ICD-10-CM

## 2018-08-16 PROCEDURE — 99213 OFFICE O/P EST LOW 20 MIN: CPT | Performed by: NURSE PRACTITIONER

## 2018-08-16 RX ORDER — LEVOFLOXACIN 500 MG/1
500 TABLET, FILM COATED ORAL DAILY
Qty: 10 TABLET | Refills: 0 | Status: SHIPPED | OUTPATIENT
Start: 2018-08-16 | End: 2018-08-21 | Stop reason: HOSPADM

## 2018-08-16 NOTE — PROGRESS NOTES
Subjective   Adriel Mackenzie is a 82 y.o. male.     History of Present Illness   Adriel Mackenzie 82 y.o. male who presents for evaluation of cough. Symptoms include productive cough, exertional SOA and chest congestion.  Onset of symptoms was 1 week ago, gradually worsening since that time. Patient denies fever.   Evaluation to date: none Treatment to date:  Mucinex. Hx of COPD.     The following portions of the patient's history were reviewed and updated as appropriate: allergies, current medications, past family history, past medical history, past social history, past surgical history and problem list.    Review of Systems   Constitutional: Negative for chills and fever.   HENT: Negative for congestion, ear pain, sinus pressure and sore throat.    Respiratory: Positive for cough, chest tightness, shortness of breath and wheezing.        Objective   Physical Exam   Constitutional: He is oriented to person, place, and time. He appears well-developed and well-nourished.   HENT:   Right Ear: Tympanic membrane, external ear and ear canal normal.   Left Ear: Tympanic membrane, external ear and ear canal normal.   Nose: Right sinus exhibits no maxillary sinus tenderness and no frontal sinus tenderness. Left sinus exhibits no maxillary sinus tenderness and no frontal sinus tenderness.   Mouth/Throat: Uvula is midline and oropharynx is clear and moist.   Cardiovascular: Normal rate and regular rhythm.    Pulmonary/Chest: Effort normal. He has decreased breath sounds in the right lower field and the left lower field.   Neurological: He is oriented to person, place, and time.   Skin: Skin is warm and dry.   Psychiatric: He has a normal mood and affect. His behavior is normal. Judgment and thought content normal.   Nursing note and vitals reviewed.      Assessment/Plan   Adriel was seen today for uri.    Diagnoses and all orders for this visit:    Bronchitis  -     levoFLOXacin (LEVAQUIN) 500 MG tablet; Take 1  tablet by mouth Daily. For Respiratory infection

## 2018-08-18 ENCOUNTER — APPOINTMENT (OUTPATIENT)
Dept: CT IMAGING | Facility: HOSPITAL | Age: 83
End: 2018-08-18

## 2018-08-18 ENCOUNTER — APPOINTMENT (OUTPATIENT)
Dept: GENERAL RADIOLOGY | Facility: HOSPITAL | Age: 83
End: 2018-08-18

## 2018-08-18 ENCOUNTER — HOSPITAL ENCOUNTER (INPATIENT)
Facility: HOSPITAL | Age: 83
LOS: 3 days | Discharge: HOME | End: 2018-08-21
Attending: EMERGENCY MEDICINE | Admitting: HOSPITALIST

## 2018-08-18 ENCOUNTER — APPOINTMENT (OUTPATIENT)
Dept: CARDIOLOGY | Facility: HOSPITAL | Age: 83
End: 2018-08-18
Attending: INTERNAL MEDICINE

## 2018-08-18 DIAGNOSIS — E87.1 HYPONATREMIA: ICD-10-CM

## 2018-08-18 DIAGNOSIS — J92.9 PLEURAL THICKENING: ICD-10-CM

## 2018-08-18 DIAGNOSIS — J90 PLEURAL EFFUSION: ICD-10-CM

## 2018-08-18 DIAGNOSIS — J18.9 PNEUMONIA OF LEFT LOWER LOBE DUE TO INFECTIOUS ORGANISM: Primary | ICD-10-CM

## 2018-08-18 LAB
ALBUMIN SERPL-MCNC: 3.8 G/DL (ref 3.5–5.2)
ALBUMIN/GLOB SERPL: 1.3 G/DL
ALP SERPL-CCNC: 81 U/L (ref 39–117)
ALT SERPL W P-5'-P-CCNC: 14 U/L (ref 1–41)
ANION GAP SERPL CALCULATED.3IONS-SCNC: 11.2 MMOL/L
ANION GAP SERPL CALCULATED.3IONS-SCNC: 12.5 MMOL/L
ANION GAP SERPL CALCULATED.3IONS-SCNC: 13.5 MMOL/L
AST SERPL-CCNC: 22 U/L (ref 1–40)
B PERT DNA SPEC QL NAA+PROBE: NOT DETECTED
BASOPHILS # BLD AUTO: 0.01 10*3/MM3 (ref 0–0.2)
BASOPHILS NFR BLD AUTO: 0.1 % (ref 0–1.5)
BH CV LOW VAS LEFT GASTRONEMIUS VESSEL: 1
BH CV LOW VAS RIGHT GASTRONEMIUS VESSEL: 1
BH CV LOWER VASCULAR LEFT COMMON FEMORAL AUGMENT: NORMAL
BH CV LOWER VASCULAR LEFT COMMON FEMORAL COMPETENT: NORMAL
BH CV LOWER VASCULAR LEFT COMMON FEMORAL COMPRESS: NORMAL
BH CV LOWER VASCULAR LEFT COMMON FEMORAL PHASIC: NORMAL
BH CV LOWER VASCULAR LEFT COMMON FEMORAL SPONT: NORMAL
BH CV LOWER VASCULAR LEFT DISTAL FEMORAL COMPRESS: NORMAL
BH CV LOWER VASCULAR LEFT GASTRONEMIUS COMPRESS: NORMAL
BH CV LOWER VASCULAR LEFT GASTRONEMIUS THROMBUS: NORMAL
BH CV LOWER VASCULAR LEFT GREATER SAPH AK COMPRESS: NORMAL
BH CV LOWER VASCULAR LEFT GREATER SAPH BK COMPRESS: NORMAL
BH CV LOWER VASCULAR LEFT MID FEMORAL AUGMENT: NORMAL
BH CV LOWER VASCULAR LEFT MID FEMORAL COMPETENT: NORMAL
BH CV LOWER VASCULAR LEFT MID FEMORAL COMPRESS: NORMAL
BH CV LOWER VASCULAR LEFT MID FEMORAL PHASIC: NORMAL
BH CV LOWER VASCULAR LEFT MID FEMORAL SPONT: NORMAL
BH CV LOWER VASCULAR LEFT PERONEAL COMPRESS: NORMAL
BH CV LOWER VASCULAR LEFT POPLITEAL AUGMENT: NORMAL
BH CV LOWER VASCULAR LEFT POPLITEAL COMPETENT: NORMAL
BH CV LOWER VASCULAR LEFT POPLITEAL COMPRESS: NORMAL
BH CV LOWER VASCULAR LEFT POPLITEAL PHASIC: NORMAL
BH CV LOWER VASCULAR LEFT POPLITEAL SPONT: NORMAL
BH CV LOWER VASCULAR LEFT POSTERIOR TIBIAL COMPRESS: NORMAL
BH CV LOWER VASCULAR LEFT PROXIMAL FEMORAL COMPRESS: NORMAL
BH CV LOWER VASCULAR LEFT SAPHENOFEMORAL JUNCTION AUGMENT: NORMAL
BH CV LOWER VASCULAR LEFT SAPHENOFEMORAL JUNCTION COMPETENT: NORMAL
BH CV LOWER VASCULAR LEFT SAPHENOFEMORAL JUNCTION COMPRESS: NORMAL
BH CV LOWER VASCULAR LEFT SAPHENOFEMORAL JUNCTION PHASIC: NORMAL
BH CV LOWER VASCULAR LEFT SAPHENOFEMORAL JUNCTION SPONT: NORMAL
BH CV LOWER VASCULAR RIGHT COMMON FEMORAL AUGMENT: NORMAL
BH CV LOWER VASCULAR RIGHT COMMON FEMORAL COMPETENT: NORMAL
BH CV LOWER VASCULAR RIGHT COMMON FEMORAL COMPRESS: NORMAL
BH CV LOWER VASCULAR RIGHT COMMON FEMORAL PHASIC: NORMAL
BH CV LOWER VASCULAR RIGHT COMMON FEMORAL SPONT: NORMAL
BH CV LOWER VASCULAR RIGHT DISTAL FEMORAL COMPRESS: NORMAL
BH CV LOWER VASCULAR RIGHT GASTRONEMIUS COMPRESS: NORMAL
BH CV LOWER VASCULAR RIGHT GASTRONEMIUS THROMBUS: NORMAL
BH CV LOWER VASCULAR RIGHT GREATER SAPH AK COMPRESS: NORMAL
BH CV LOWER VASCULAR RIGHT GREATER SAPH BK COMPRESS: NORMAL
BH CV LOWER VASCULAR RIGHT MID FEMORAL AUGMENT: NORMAL
BH CV LOWER VASCULAR RIGHT MID FEMORAL COMPETENT: NORMAL
BH CV LOWER VASCULAR RIGHT MID FEMORAL COMPRESS: NORMAL
BH CV LOWER VASCULAR RIGHT MID FEMORAL PHASIC: NORMAL
BH CV LOWER VASCULAR RIGHT MID FEMORAL SPONT: NORMAL
BH CV LOWER VASCULAR RIGHT PERONEAL COMPRESS: NORMAL
BH CV LOWER VASCULAR RIGHT POPLITEAL AUGMENT: NORMAL
BH CV LOWER VASCULAR RIGHT POPLITEAL COMPETENT: NORMAL
BH CV LOWER VASCULAR RIGHT POPLITEAL COMPRESS: NORMAL
BH CV LOWER VASCULAR RIGHT POPLITEAL PHASIC: NORMAL
BH CV LOWER VASCULAR RIGHT POPLITEAL SPONT: NORMAL
BH CV LOWER VASCULAR RIGHT POSTERIOR TIBIAL COMPRESS: NORMAL
BH CV LOWER VASCULAR RIGHT PROXIMAL FEMORAL COMPRESS: NORMAL
BH CV LOWER VASCULAR RIGHT SAPHENOFEMORAL JUNCTION AUGMENT: NORMAL
BH CV LOWER VASCULAR RIGHT SAPHENOFEMORAL JUNCTION COMPETENT: NORMAL
BH CV LOWER VASCULAR RIGHT SAPHENOFEMORAL JUNCTION COMPRESS: NORMAL
BH CV LOWER VASCULAR RIGHT SAPHENOFEMORAL JUNCTION PHASIC: NORMAL
BH CV LOWER VASCULAR RIGHT SAPHENOFEMORAL JUNCTION SPONT: NORMAL
BILIRUB SERPL-MCNC: 0.6 MG/DL (ref 0.1–1.2)
BILIRUB UR QL STRIP: NEGATIVE
BUN BLD-MCNC: 10 MG/DL (ref 8–23)
BUN BLD-MCNC: 7 MG/DL (ref 8–23)
BUN BLD-MCNC: 7 MG/DL (ref 8–23)
BUN/CREAT SERPL: 12.8 (ref 7–25)
BUN/CREAT SERPL: 9.3 (ref 7–25)
BUN/CREAT SERPL: 9.5 (ref 7–25)
C PNEUM DNA NPH QL NAA+NON-PROBE: NOT DETECTED
CALCIUM SPEC-SCNC: 8.6 MG/DL (ref 8.6–10.5)
CALCIUM SPEC-SCNC: 8.7 MG/DL (ref 8.6–10.5)
CALCIUM SPEC-SCNC: 8.9 MG/DL (ref 8.6–10.5)
CHLORIDE SERPL-SCNC: 86 MMOL/L (ref 98–107)
CHLORIDE SERPL-SCNC: 86 MMOL/L (ref 98–107)
CHLORIDE SERPL-SCNC: 88 MMOL/L (ref 98–107)
CLARITY UR: CLEAR
CO2 SERPL-SCNC: 23.5 MMOL/L (ref 22–29)
CO2 SERPL-SCNC: 24.5 MMOL/L (ref 22–29)
CO2 SERPL-SCNC: 24.8 MMOL/L (ref 22–29)
COLOR UR: YELLOW
CREAT BLD-MCNC: 0.74 MG/DL (ref 0.76–1.27)
CREAT BLD-MCNC: 0.75 MG/DL (ref 0.76–1.27)
CREAT BLD-MCNC: 0.78 MG/DL (ref 0.76–1.27)
CREAT UR-MCNC: 52.8 MG/DL
D DIMER PPP FEU-MCNC: 2.63 MCGFEU/ML (ref 0–0.49)
D-LACTATE SERPL-SCNC: 1.1 MMOL/L (ref 0.5–2)
DEPRECATED RDW RBC AUTO: 41.2 FL (ref 37–54)
EOSINOPHIL # BLD AUTO: 0.1 10*3/MM3 (ref 0–0.7)
EOSINOPHIL NFR BLD AUTO: 1.4 % (ref 0.3–6.2)
ERYTHROCYTE [DISTWIDTH] IN BLOOD BY AUTOMATED COUNT: 13 % (ref 11.5–14.5)
FLUAV H1 2009 PAND RNA NPH QL NAA+PROBE: NOT DETECTED
FLUAV H1 HA GENE NPH QL NAA+PROBE: NOT DETECTED
FLUAV H3 RNA NPH QL NAA+PROBE: NOT DETECTED
FLUAV SUBTYP SPEC NAA+PROBE: NOT DETECTED
FLUBV RNA ISLT QL NAA+PROBE: NOT DETECTED
GFR SERPL CREATININE-BSD FRML MDRD: 100 ML/MIN/1.73
GFR SERPL CREATININE-BSD FRML MDRD: 102 ML/MIN/1.73
GFR SERPL CREATININE-BSD FRML MDRD: 96 ML/MIN/1.73
GLOBULIN UR ELPH-MCNC: 2.9 GM/DL
GLUCOSE BLD-MCNC: 102 MG/DL (ref 65–99)
GLUCOSE BLD-MCNC: 108 MG/DL (ref 65–99)
GLUCOSE BLD-MCNC: 141 MG/DL (ref 65–99)
GLUCOSE UR STRIP-MCNC: NEGATIVE MG/DL
HADV DNA SPEC NAA+PROBE: NOT DETECTED
HCOV 229E RNA SPEC QL NAA+PROBE: NOT DETECTED
HCOV HKU1 RNA SPEC QL NAA+PROBE: NOT DETECTED
HCOV NL63 RNA SPEC QL NAA+PROBE: NOT DETECTED
HCOV OC43 RNA SPEC QL NAA+PROBE: NOT DETECTED
HCT VFR BLD AUTO: 33.3 % (ref 40.4–52.2)
HGB BLD-MCNC: 11.1 G/DL (ref 13.7–17.6)
HGB UR QL STRIP.AUTO: NEGATIVE
HMPV RNA NPH QL NAA+NON-PROBE: NOT DETECTED
HPIV1 RNA SPEC QL NAA+PROBE: NOT DETECTED
HPIV2 RNA SPEC QL NAA+PROBE: NOT DETECTED
HPIV3 RNA NPH QL NAA+PROBE: NOT DETECTED
HPIV4 P GENE NPH QL NAA+PROBE: NOT DETECTED
IMM GRANULOCYTES # BLD: 0.01 10*3/MM3 (ref 0–0.03)
IMM GRANULOCYTES NFR BLD: 0.1 % (ref 0–0.5)
KETONES UR QL STRIP: NEGATIVE
LEUKOCYTE ESTERASE UR QL STRIP.AUTO: NEGATIVE
LYMPHOCYTES # BLD AUTO: 0.71 10*3/MM3 (ref 0.9–4.8)
LYMPHOCYTES NFR BLD AUTO: 10.3 % (ref 19.6–45.3)
M PNEUMO IGG SER IA-ACNC: NOT DETECTED
MCH RBC QN AUTO: 28.7 PG (ref 27–32.7)
MCHC RBC AUTO-ENTMCNC: 33.3 G/DL (ref 32.6–36.4)
MCV RBC AUTO: 86 FL (ref 79.8–96.2)
MONOCYTES # BLD AUTO: 0.66 10*3/MM3 (ref 0.2–1.2)
MONOCYTES NFR BLD AUTO: 9.6 % (ref 5–12)
NEUTROPHILS # BLD AUTO: 5.43 10*3/MM3 (ref 1.9–8.1)
NEUTROPHILS NFR BLD AUTO: 78.6 % (ref 42.7–76)
NITRITE UR QL STRIP: NEGATIVE
NT-PROBNP SERPL-MCNC: 386 PG/ML (ref 0–1800)
OSMOLALITY SERPL: 258 MOSM/KG (ref 280–301)
OSMOLALITY UR: 489 MOSM/KG
PH UR STRIP.AUTO: 8 [PH] (ref 5–8)
PLATELET # BLD AUTO: 243 10*3/MM3 (ref 140–500)
PMV BLD AUTO: 8.3 FL (ref 6–12)
POTASSIUM BLD-SCNC: 3.9 MMOL/L (ref 3.5–5.2)
POTASSIUM BLD-SCNC: 4 MMOL/L (ref 3.5–5.2)
POTASSIUM BLD-SCNC: 4.1 MMOL/L (ref 3.5–5.2)
PROCALCITONIN SERPL-MCNC: 0.02 NG/ML (ref 0.1–0.25)
PROT SERPL-MCNC: 6.7 G/DL (ref 6–8.5)
PROT UR QL STRIP: NEGATIVE
RBC # BLD AUTO: 3.87 10*6/MM3 (ref 4.6–6)
RHINOVIRUS RNA SPEC NAA+PROBE: NOT DETECTED
RSV RNA NPH QL NAA+NON-PROBE: NOT DETECTED
SODIUM BLD-SCNC: 122 MMOL/L (ref 136–145)
SODIUM BLD-SCNC: 124 MMOL/L (ref 136–145)
SODIUM BLD-SCNC: 124 MMOL/L (ref 136–145)
SODIUM UR-SCNC: 147 MMOL/L
SP GR UR STRIP: 1.01 (ref 1–1.03)
TROPONIN T SERPL-MCNC: <0.01 NG/ML (ref 0–0.03)
TSH SERPL DL<=0.05 MIU/L-ACNC: 1.58 MIU/ML (ref 0.27–4.2)
UROBILINOGEN UR QL STRIP: NORMAL
WBC NRBC COR # BLD: 6.91 10*3/MM3 (ref 4.5–10.7)

## 2018-08-18 PROCEDURE — 83880 ASSAY OF NATRIURETIC PEPTIDE: CPT | Performed by: EMERGENCY MEDICINE

## 2018-08-18 PROCEDURE — 87486 CHLMYD PNEUM DNA AMP PROBE: CPT | Performed by: INTERNAL MEDICINE

## 2018-08-18 PROCEDURE — 25010000002 CEFTRIAXONE PER 250 MG: Performed by: EMERGENCY MEDICINE

## 2018-08-18 PROCEDURE — 85025 COMPLETE CBC W/AUTO DIFF WBC: CPT | Performed by: EMERGENCY MEDICINE

## 2018-08-18 PROCEDURE — 83935 ASSAY OF URINE OSMOLALITY: CPT | Performed by: INTERNAL MEDICINE

## 2018-08-18 PROCEDURE — 84443 ASSAY THYROID STIM HORMONE: CPT | Performed by: INTERNAL MEDICINE

## 2018-08-18 PROCEDURE — 87581 M.PNEUMON DNA AMP PROBE: CPT | Performed by: INTERNAL MEDICINE

## 2018-08-18 PROCEDURE — 25010000002 AZITHROMYCIN PER 500 MG: Performed by: EMERGENCY MEDICINE

## 2018-08-18 PROCEDURE — 80053 COMPREHEN METABOLIC PANEL: CPT | Performed by: EMERGENCY MEDICINE

## 2018-08-18 PROCEDURE — 87633 RESP VIRUS 12-25 TARGETS: CPT | Performed by: INTERNAL MEDICINE

## 2018-08-18 PROCEDURE — 84484 ASSAY OF TROPONIN QUANT: CPT | Performed by: EMERGENCY MEDICINE

## 2018-08-18 PROCEDURE — 83605 ASSAY OF LACTIC ACID: CPT | Performed by: EMERGENCY MEDICINE

## 2018-08-18 PROCEDURE — 94664 DEMO&/EVAL PT USE INHALER: CPT

## 2018-08-18 PROCEDURE — 93010 ELECTROCARDIOGRAM REPORT: CPT | Performed by: INTERNAL MEDICINE

## 2018-08-18 PROCEDURE — 87040 BLOOD CULTURE FOR BACTERIA: CPT | Performed by: EMERGENCY MEDICINE

## 2018-08-18 PROCEDURE — 0 IOPAMIDOL PER 1 ML: Performed by: EMERGENCY MEDICINE

## 2018-08-18 PROCEDURE — 81003 URINALYSIS AUTO W/O SCOPE: CPT | Performed by: EMERGENCY MEDICINE

## 2018-08-18 PROCEDURE — 25010000002 ENOXAPARIN PER 10 MG: Performed by: INTERNAL MEDICINE

## 2018-08-18 PROCEDURE — 93970 EXTREMITY STUDY: CPT

## 2018-08-18 PROCEDURE — 84300 ASSAY OF URINE SODIUM: CPT | Performed by: INTERNAL MEDICINE

## 2018-08-18 PROCEDURE — 93005 ELECTROCARDIOGRAM TRACING: CPT | Performed by: EMERGENCY MEDICINE

## 2018-08-18 PROCEDURE — 85379 FIBRIN DEGRADATION QUANT: CPT | Performed by: EMERGENCY MEDICINE

## 2018-08-18 PROCEDURE — 71275 CT ANGIOGRAPHY CHEST: CPT

## 2018-08-18 PROCEDURE — 94640 AIRWAY INHALATION TREATMENT: CPT

## 2018-08-18 PROCEDURE — 71046 X-RAY EXAM CHEST 2 VIEWS: CPT

## 2018-08-18 PROCEDURE — 87798 DETECT AGENT NOS DNA AMP: CPT | Performed by: INTERNAL MEDICINE

## 2018-08-18 PROCEDURE — 84145 PROCALCITONIN (PCT): CPT | Performed by: INTERNAL MEDICINE

## 2018-08-18 PROCEDURE — 83930 ASSAY OF BLOOD OSMOLALITY: CPT | Performed by: INTERNAL MEDICINE

## 2018-08-18 PROCEDURE — 99285 EMERGENCY DEPT VISIT HI MDM: CPT

## 2018-08-18 PROCEDURE — 94799 UNLISTED PULMONARY SVC/PX: CPT

## 2018-08-18 PROCEDURE — 82570 ASSAY OF URINE CREATININE: CPT | Performed by: INTERNAL MEDICINE

## 2018-08-18 RX ORDER — CEFTRIAXONE SODIUM 1 G/50ML
1 INJECTION, SOLUTION INTRAVENOUS ONCE
Status: COMPLETED | OUTPATIENT
Start: 2018-08-18 | End: 2018-08-18

## 2018-08-18 RX ORDER — ONDANSETRON 4 MG/1
4 TABLET, FILM COATED ORAL EVERY 6 HOURS PRN
Status: DISCONTINUED | OUTPATIENT
Start: 2018-08-18 | End: 2018-08-21 | Stop reason: HOSPADM

## 2018-08-18 RX ORDER — SODIUM CHLORIDE 0.9 % (FLUSH) 0.9 %
10 SYRINGE (ML) INJECTION AS NEEDED
Status: DISCONTINUED | OUTPATIENT
Start: 2018-08-18 | End: 2018-08-21 | Stop reason: HOSPADM

## 2018-08-18 RX ORDER — ALBUTEROL SULFATE 90 UG/1
2 AEROSOL, METERED RESPIRATORY (INHALATION) EVERY 4 HOURS PRN
Status: DISCONTINUED | OUTPATIENT
Start: 2018-08-18 | End: 2018-08-20 | Stop reason: CLARIF

## 2018-08-18 RX ORDER — SODIUM CHLORIDE 0.9 % (FLUSH) 0.9 %
1-10 SYRINGE (ML) INJECTION AS NEEDED
Status: DISCONTINUED | OUTPATIENT
Start: 2018-08-18 | End: 2018-08-21 | Stop reason: HOSPADM

## 2018-08-18 RX ORDER — CLOPIDOGREL BISULFATE 75 MG/1
75 TABLET ORAL EVERY OTHER DAY
Status: DISCONTINUED | OUTPATIENT
Start: 2018-08-18 | End: 2018-08-21 | Stop reason: HOSPADM

## 2018-08-18 RX ORDER — METOPROLOL SUCCINATE 25 MG/1
25 TABLET, EXTENDED RELEASE ORAL DAILY
Status: DISCONTINUED | OUTPATIENT
Start: 2018-08-18 | End: 2018-08-21 | Stop reason: HOSPADM

## 2018-08-18 RX ORDER — ASPIRIN 81 MG/1
81 TABLET ORAL DAILY
Status: DISCONTINUED | OUTPATIENT
Start: 2018-08-18 | End: 2018-08-21 | Stop reason: HOSPADM

## 2018-08-18 RX ORDER — ONDANSETRON 4 MG/1
4 TABLET, ORALLY DISINTEGRATING ORAL EVERY 6 HOURS PRN
Status: DISCONTINUED | OUTPATIENT
Start: 2018-08-18 | End: 2018-08-21 | Stop reason: HOSPADM

## 2018-08-18 RX ORDER — ACETAMINOPHEN 325 MG/1
650 TABLET ORAL EVERY 4 HOURS PRN
Status: DISCONTINUED | OUTPATIENT
Start: 2018-08-18 | End: 2018-08-21 | Stop reason: HOSPADM

## 2018-08-18 RX ORDER — ONDANSETRON 2 MG/ML
4 INJECTION INTRAMUSCULAR; INTRAVENOUS EVERY 6 HOURS PRN
Status: DISCONTINUED | OUTPATIENT
Start: 2018-08-18 | End: 2018-08-21 | Stop reason: HOSPADM

## 2018-08-18 RX ORDER — AMLODIPINE BESYLATE 5 MG/1
5 TABLET ORAL
Status: DISCONTINUED | OUTPATIENT
Start: 2018-08-18 | End: 2018-08-20

## 2018-08-18 RX ORDER — ROSUVASTATIN CALCIUM 5 MG/1
5 TABLET, COATED ORAL DAILY
Status: DISCONTINUED | OUTPATIENT
Start: 2018-08-18 | End: 2018-08-21 | Stop reason: HOSPADM

## 2018-08-18 RX ORDER — BUDESONIDE AND FORMOTEROL FUMARATE DIHYDRATE 80; 4.5 UG/1; UG/1
2 AEROSOL RESPIRATORY (INHALATION)
Status: DISCONTINUED | OUTPATIENT
Start: 2018-08-18 | End: 2018-08-21 | Stop reason: HOSPADM

## 2018-08-18 RX ORDER — TOLVAPTAN 15 MG/1
7.5 TABLET ORAL DAILY
Status: DISCONTINUED | OUTPATIENT
Start: 2018-08-18 | End: 2018-08-19

## 2018-08-18 RX ORDER — PANTOPRAZOLE SODIUM 40 MG/1
40 TABLET, DELAYED RELEASE ORAL EVERY MORNING
Status: DISCONTINUED | OUTPATIENT
Start: 2018-08-19 | End: 2018-08-21 | Stop reason: HOSPADM

## 2018-08-18 RX ORDER — CEFTRIAXONE SODIUM 1 G/50ML
1 INJECTION, SOLUTION INTRAVENOUS EVERY 24 HOURS
Status: DISCONTINUED | OUTPATIENT
Start: 2018-08-19 | End: 2018-08-21 | Stop reason: HOSPADM

## 2018-08-18 RX ADMIN — BUDESONIDE AND FORMOTEROL FUMARATE DIHYDRATE 2 PUFF: 80; 4.5 AEROSOL RESPIRATORY (INHALATION) at 20:47

## 2018-08-18 RX ADMIN — TOLVAPTAN 7.5 MG: 15 TABLET ORAL at 18:33

## 2018-08-18 RX ADMIN — CLOPIDOGREL 75 MG: 75 TABLET, FILM COATED ORAL at 20:28

## 2018-08-18 RX ADMIN — ROSUVASTATIN CALCIUM 5 MG: 5 TABLET, FILM COATED ORAL at 18:33

## 2018-08-18 RX ADMIN — CEFTRIAXONE SODIUM 1 G: 1 INJECTION, SOLUTION INTRAVENOUS at 23:01

## 2018-08-18 RX ADMIN — ASPIRIN 81 MG: 81 TABLET ORAL at 18:33

## 2018-08-18 RX ADMIN — ENOXAPARIN SODIUM 70 MG: 80 INJECTION SUBCUTANEOUS at 20:27

## 2018-08-18 RX ADMIN — AMLODIPINE BESYLATE 5 MG: 5 TABLET ORAL at 20:28

## 2018-08-18 RX ADMIN — IOPAMIDOL 95 ML: 755 INJECTION, SOLUTION INTRAVENOUS at 12:02

## 2018-08-18 RX ADMIN — METOPROLOL SUCCINATE 25 MG: 25 TABLET, FILM COATED, EXTENDED RELEASE ORAL at 18:33

## 2018-08-18 RX ADMIN — AZITHROMYCIN MONOHYDRATE 500 MG: 500 INJECTION, POWDER, LYOPHILIZED, FOR SOLUTION INTRAVENOUS at 14:05

## 2018-08-19 PROBLEM — I82.4Z3 ACUTE DEEP VEIN THROMBOSIS (DVT) OF DISTAL VEIN OF BOTH LOWER EXTREMITIES (HCC): Status: ACTIVE | Noted: 2018-08-19

## 2018-08-19 PROBLEM — E22.2 SIADH (SYNDROME OF INAPPROPRIATE ADH PRODUCTION) (HCC): Status: ACTIVE | Noted: 2018-08-19

## 2018-08-19 LAB
ANION GAP SERPL CALCULATED.3IONS-SCNC: 12 MMOL/L
ANION GAP SERPL CALCULATED.3IONS-SCNC: 12.4 MMOL/L
ANION GAP SERPL CALCULATED.3IONS-SCNC: 12.8 MMOL/L
BUN BLD-MCNC: 6 MG/DL (ref 8–23)
BUN BLD-MCNC: 6 MG/DL (ref 8–23)
BUN BLD-MCNC: 9 MG/DL (ref 8–23)
BUN/CREAT SERPL: 10.5 (ref 7–25)
BUN/CREAT SERPL: 7.9 (ref 7–25)
BUN/CREAT SERPL: 8.3 (ref 7–25)
CALCIUM SPEC-SCNC: 8.8 MG/DL (ref 8.6–10.5)
CALCIUM SPEC-SCNC: 9.1 MG/DL (ref 8.6–10.5)
CALCIUM SPEC-SCNC: 9.4 MG/DL (ref 8.6–10.5)
CHLORIDE SERPL-SCNC: 96 MMOL/L (ref 98–107)
CHLORIDE SERPL-SCNC: 97 MMOL/L (ref 98–107)
CHLORIDE SERPL-SCNC: 98 MMOL/L (ref 98–107)
CO2 SERPL-SCNC: 24.2 MMOL/L (ref 22–29)
CO2 SERPL-SCNC: 24.6 MMOL/L (ref 22–29)
CO2 SERPL-SCNC: 25 MMOL/L (ref 22–29)
CORTIS SERPL-MCNC: 9.77 MCG/DL
CREAT BLD-MCNC: 0.72 MG/DL (ref 0.76–1.27)
CREAT BLD-MCNC: 0.76 MG/DL (ref 0.76–1.27)
CREAT BLD-MCNC: 0.86 MG/DL (ref 0.76–1.27)
GFR SERPL CREATININE-BSD FRML MDRD: 105 ML/MIN/1.73
GFR SERPL CREATININE-BSD FRML MDRD: 85 ML/MIN/1.73
GFR SERPL CREATININE-BSD FRML MDRD: 98 ML/MIN/1.73
GLUCOSE BLD-MCNC: 100 MG/DL (ref 65–99)
GLUCOSE BLD-MCNC: 107 MG/DL (ref 65–99)
GLUCOSE BLD-MCNC: 98 MG/DL (ref 65–99)
POTASSIUM BLD-SCNC: 3.7 MMOL/L (ref 3.5–5.2)
POTASSIUM BLD-SCNC: 3.9 MMOL/L (ref 3.5–5.2)
POTASSIUM BLD-SCNC: 3.9 MMOL/L (ref 3.5–5.2)
SODIUM BLD-SCNC: 133 MMOL/L (ref 136–145)
SODIUM BLD-SCNC: 133 MMOL/L (ref 136–145)
SODIUM BLD-SCNC: 134 MMOL/L (ref 136–145)
SODIUM BLD-SCNC: 135 MMOL/L (ref 136–145)
SODIUM BLD-SCNC: 135 MMOL/L (ref 136–145)
TSH SERPL DL<=0.05 MIU/L-ACNC: 1.97 MIU/ML (ref 0.27–4.2)

## 2018-08-19 PROCEDURE — 36415 COLL VENOUS BLD VENIPUNCTURE: CPT | Performed by: INTERNAL MEDICINE

## 2018-08-19 PROCEDURE — 94799 UNLISTED PULMONARY SVC/PX: CPT

## 2018-08-19 PROCEDURE — 80048 BASIC METABOLIC PNL TOTAL CA: CPT | Performed by: INTERNAL MEDICINE

## 2018-08-19 PROCEDURE — 82533 TOTAL CORTISOL: CPT | Performed by: INTERNAL MEDICINE

## 2018-08-19 PROCEDURE — 84295 ASSAY OF SERUM SODIUM: CPT | Performed by: INTERNAL MEDICINE

## 2018-08-19 PROCEDURE — 84443 ASSAY THYROID STIM HORMONE: CPT | Performed by: INTERNAL MEDICINE

## 2018-08-19 PROCEDURE — 25010000002 AZITHROMYCIN PER 500 MG: Performed by: INTERNAL MEDICINE

## 2018-08-19 PROCEDURE — 25010000002 ENOXAPARIN PER 10 MG: Performed by: INTERNAL MEDICINE

## 2018-08-19 RX ORDER — DEXTROSE MONOHYDRATE 50 MG/ML
50 INJECTION, SOLUTION INTRAVENOUS CONTINUOUS
Status: DISCONTINUED | OUTPATIENT
Start: 2018-08-19 | End: 2018-08-20

## 2018-08-19 RX ADMIN — BUDESONIDE AND FORMOTEROL FUMARATE DIHYDRATE 2 PUFF: 80; 4.5 AEROSOL RESPIRATORY (INHALATION) at 07:34

## 2018-08-19 RX ADMIN — DEXTROSE MONOHYDRATE 50 ML/HR: 50 INJECTION, SOLUTION INTRAVENOUS at 21:02

## 2018-08-19 RX ADMIN — METOPROLOL SUCCINATE 25 MG: 25 TABLET, FILM COATED, EXTENDED RELEASE ORAL at 08:36

## 2018-08-19 RX ADMIN — ENOXAPARIN SODIUM 70 MG: 80 INJECTION SUBCUTANEOUS at 08:36

## 2018-08-19 RX ADMIN — DEXTROSE MONOHYDRATE 50 ML/HR: 50 INJECTION, SOLUTION INTRAVENOUS at 03:04

## 2018-08-19 RX ADMIN — ASPIRIN 81 MG: 81 TABLET ORAL at 08:36

## 2018-08-19 RX ADMIN — AMLODIPINE BESYLATE 5 MG: 5 TABLET ORAL at 08:36

## 2018-08-19 RX ADMIN — ENOXAPARIN SODIUM 70 MG: 80 INJECTION SUBCUTANEOUS at 21:01

## 2018-08-19 RX ADMIN — PANTOPRAZOLE SODIUM 40 MG: 40 TABLET, DELAYED RELEASE ORAL at 06:37

## 2018-08-19 RX ADMIN — BUDESONIDE AND FORMOTEROL FUMARATE DIHYDRATE 2 PUFF: 80; 4.5 AEROSOL RESPIRATORY (INHALATION) at 21:11

## 2018-08-19 RX ADMIN — AZITHROMYCIN MONOHYDRATE 500 MG: 500 INJECTION, POWDER, LYOPHILIZED, FOR SOLUTION INTRAVENOUS at 15:50

## 2018-08-19 RX ADMIN — ROSUVASTATIN CALCIUM 5 MG: 5 TABLET, FILM COATED ORAL at 08:36

## 2018-08-20 LAB
ANION GAP SERPL CALCULATED.3IONS-SCNC: 13.1 MMOL/L
BUN BLD-MCNC: 7 MG/DL (ref 8–23)
BUN/CREAT SERPL: 9.2 (ref 7–25)
CALCIUM SPEC-SCNC: 8.6 MG/DL (ref 8.6–10.5)
CHLORIDE SERPL-SCNC: 97 MMOL/L (ref 98–107)
CO2 SERPL-SCNC: 24.9 MMOL/L (ref 22–29)
CREAT BLD-MCNC: 0.76 MG/DL (ref 0.76–1.27)
GFR SERPL CREATININE-BSD FRML MDRD: 98 ML/MIN/1.73
GLUCOSE BLD-MCNC: 99 MG/DL (ref 65–99)
POTASSIUM BLD-SCNC: 3.8 MMOL/L (ref 3.5–5.2)
SODIUM BLD-SCNC: 133 MMOL/L (ref 136–145)
SODIUM BLD-SCNC: 135 MMOL/L (ref 136–145)

## 2018-08-20 PROCEDURE — 25010000002 AZITHROMYCIN PER 500 MG: Performed by: INTERNAL MEDICINE

## 2018-08-20 PROCEDURE — 94799 UNLISTED PULMONARY SVC/PX: CPT

## 2018-08-20 PROCEDURE — 99232 SBSQ HOSP IP/OBS MODERATE 35: CPT | Performed by: NURSE PRACTITIONER

## 2018-08-20 PROCEDURE — 84295 ASSAY OF SERUM SODIUM: CPT | Performed by: INTERNAL MEDICINE

## 2018-08-20 PROCEDURE — 25010000002 ENOXAPARIN PER 10 MG: Performed by: INTERNAL MEDICINE

## 2018-08-20 PROCEDURE — 80048 BASIC METABOLIC PNL TOTAL CA: CPT | Performed by: INTERNAL MEDICINE

## 2018-08-20 PROCEDURE — 25010000002 CEFTRIAXONE PER 250 MG: Performed by: INTERNAL MEDICINE

## 2018-08-20 RX ORDER — AMLODIPINE BESYLATE 10 MG/1
10 TABLET ORAL
Status: DISCONTINUED | OUTPATIENT
Start: 2018-08-21 | End: 2018-08-21 | Stop reason: HOSPADM

## 2018-08-20 RX ORDER — ALBUTEROL SULFATE 2.5 MG/3ML
2.5 SOLUTION RESPIRATORY (INHALATION) EVERY 4 HOURS PRN
Status: DISCONTINUED | OUTPATIENT
Start: 2018-08-20 | End: 2018-08-21 | Stop reason: HOSPADM

## 2018-08-20 RX ADMIN — BUDESONIDE AND FORMOTEROL FUMARATE DIHYDRATE 2 PUFF: 80; 4.5 AEROSOL RESPIRATORY (INHALATION) at 07:11

## 2018-08-20 RX ADMIN — CEFTRIAXONE SODIUM 1 G: 1 INJECTION, SOLUTION INTRAVENOUS at 13:35

## 2018-08-20 RX ADMIN — METOPROLOL SUCCINATE 25 MG: 25 TABLET, FILM COATED, EXTENDED RELEASE ORAL at 08:15

## 2018-08-20 RX ADMIN — CEFTRIAXONE SODIUM 1 G: 1 INJECTION, SOLUTION INTRAVENOUS at 00:18

## 2018-08-20 RX ADMIN — ROSUVASTATIN CALCIUM 5 MG: 5 TABLET, FILM COATED ORAL at 08:15

## 2018-08-20 RX ADMIN — CLOPIDOGREL 75 MG: 75 TABLET, FILM COATED ORAL at 08:15

## 2018-08-20 RX ADMIN — AZITHROMYCIN MONOHYDRATE 500 MG: 500 INJECTION, POWDER, LYOPHILIZED, FOR SOLUTION INTRAVENOUS at 14:54

## 2018-08-20 RX ADMIN — APIXABAN 5 MG: 5 TABLET, FILM COATED ORAL at 20:08

## 2018-08-20 RX ADMIN — ASPIRIN 81 MG: 81 TABLET ORAL at 08:16

## 2018-08-20 RX ADMIN — ACETAMINOPHEN 650 MG: 325 TABLET, FILM COATED ORAL at 08:16

## 2018-08-20 RX ADMIN — AMLODIPINE BESYLATE 5 MG: 5 TABLET ORAL at 08:15

## 2018-08-20 RX ADMIN — BUDESONIDE AND FORMOTEROL FUMARATE DIHYDRATE 2 PUFF: 80; 4.5 AEROSOL RESPIRATORY (INHALATION) at 20:13

## 2018-08-20 RX ADMIN — ENOXAPARIN SODIUM 70 MG: 80 INJECTION SUBCUTANEOUS at 08:16

## 2018-08-20 RX ADMIN — PANTOPRAZOLE SODIUM 40 MG: 40 TABLET, DELAYED RELEASE ORAL at 08:16

## 2018-08-21 VITALS
HEART RATE: 78 BPM | HEIGHT: 67 IN | WEIGHT: 158.5 LBS | SYSTOLIC BLOOD PRESSURE: 174 MMHG | RESPIRATION RATE: 18 BRPM | OXYGEN SATURATION: 96 % | BODY MASS INDEX: 24.88 KG/M2 | DIASTOLIC BLOOD PRESSURE: 86 MMHG | TEMPERATURE: 98.4 F

## 2018-08-21 PROCEDURE — 94799 UNLISTED PULMONARY SVC/PX: CPT

## 2018-08-21 PROCEDURE — 99232 SBSQ HOSP IP/OBS MODERATE 35: CPT | Performed by: NURSE PRACTITIONER

## 2018-08-21 RX ORDER — DOXYCYCLINE HYCLATE 100 MG/1
100 CAPSULE ORAL EVERY 12 HOURS SCHEDULED
Qty: 20 CAPSULE | Refills: 0 | Status: SHIPPED | OUTPATIENT
Start: 2018-08-21 | End: 2018-09-02 | Stop reason: HOSPADM

## 2018-08-21 RX ORDER — ACETAMINOPHEN 325 MG/1
650 TABLET ORAL EVERY 4 HOURS PRN
Start: 2018-08-21

## 2018-08-21 RX ADMIN — PANTOPRAZOLE SODIUM 40 MG: 40 TABLET, DELAYED RELEASE ORAL at 06:40

## 2018-08-21 RX ADMIN — BUDESONIDE AND FORMOTEROL FUMARATE DIHYDRATE 2 PUFF: 80; 4.5 AEROSOL RESPIRATORY (INHALATION) at 08:16

## 2018-08-21 RX ADMIN — AMLODIPINE BESYLATE 10 MG: 10 TABLET ORAL at 08:46

## 2018-08-21 RX ADMIN — ASPIRIN 81 MG: 81 TABLET ORAL at 08:46

## 2018-08-21 RX ADMIN — APIXABAN 5 MG: 5 TABLET, FILM COATED ORAL at 08:46

## 2018-08-21 RX ADMIN — ROSUVASTATIN CALCIUM 5 MG: 5 TABLET, FILM COATED ORAL at 08:46

## 2018-08-21 RX ADMIN — METOPROLOL SUCCINATE 25 MG: 25 TABLET, FILM COATED, EXTENDED RELEASE ORAL at 08:46

## 2018-08-23 LAB — BACTERIA SPEC AEROBE CULT: NORMAL

## 2018-08-28 ENCOUNTER — APPOINTMENT (OUTPATIENT)
Dept: GENERAL RADIOLOGY | Facility: HOSPITAL | Age: 83
End: 2018-08-28

## 2018-08-28 ENCOUNTER — HOSPITAL ENCOUNTER (EMERGENCY)
Facility: HOSPITAL | Age: 83
Discharge: HOME | End: 2018-08-29
Attending: EMERGENCY MEDICINE | Admitting: EMERGENCY MEDICINE

## 2018-08-28 DIAGNOSIS — R07.81 PLEURITIC CHEST PAIN: Primary | ICD-10-CM

## 2018-08-28 LAB
BASOPHILS # BLD AUTO: 0.02 10*3/MM3 (ref 0–0.2)
BASOPHILS NFR BLD AUTO: 0.3 % (ref 0–1.5)
BILIRUB UR QL STRIP: NEGATIVE
CLARITY UR: CLEAR
COLOR UR: YELLOW
DEPRECATED RDW RBC AUTO: 41.7 FL (ref 37–54)
EOSINOPHIL # BLD AUTO: 0.23 10*3/MM3 (ref 0–0.7)
EOSINOPHIL NFR BLD AUTO: 3.7 % (ref 0.3–6.2)
ERYTHROCYTE [DISTWIDTH] IN BLOOD BY AUTOMATED COUNT: 13 % (ref 11.5–14.5)
GLUCOSE UR STRIP-MCNC: NEGATIVE MG/DL
HCT VFR BLD AUTO: 35.3 % (ref 40.4–52.2)
HGB BLD-MCNC: 11.3 G/DL (ref 13.7–17.6)
HGB UR QL STRIP.AUTO: NEGATIVE
IMM GRANULOCYTES # BLD: 0 10*3/MM3 (ref 0–0.03)
IMM GRANULOCYTES NFR BLD: 0 % (ref 0–0.5)
KETONES UR QL STRIP: NEGATIVE
LEUKOCYTE ESTERASE UR QL STRIP.AUTO: NEGATIVE
LYMPHOCYTES # BLD AUTO: 1.03 10*3/MM3 (ref 0.9–4.8)
LYMPHOCYTES NFR BLD AUTO: 16.4 % (ref 19.6–45.3)
MCH RBC QN AUTO: 28.3 PG (ref 27–32.7)
MCHC RBC AUTO-ENTMCNC: 32 G/DL (ref 32.6–36.4)
MCV RBC AUTO: 88.5 FL (ref 79.8–96.2)
MONOCYTES # BLD AUTO: 0.91 10*3/MM3 (ref 0.2–1.2)
MONOCYTES NFR BLD AUTO: 14.5 % (ref 5–12)
NEUTROPHILS # BLD AUTO: 4.1 10*3/MM3 (ref 1.9–8.1)
NEUTROPHILS NFR BLD AUTO: 65.1 % (ref 42.7–76)
NITRITE UR QL STRIP: NEGATIVE
PH UR STRIP.AUTO: 6.5 [PH] (ref 5–8)
PLATELET # BLD AUTO: 259 10*3/MM3 (ref 140–500)
PMV BLD AUTO: 8.7 FL (ref 6–12)
PROT UR QL STRIP: NEGATIVE
RBC # BLD AUTO: 3.99 10*6/MM3 (ref 4.6–6)
SP GR UR STRIP: 1.01 (ref 1–1.03)
UROBILINOGEN UR QL STRIP: NORMAL
WBC NRBC COR # BLD: 6.29 10*3/MM3 (ref 4.5–10.7)

## 2018-08-28 PROCEDURE — 99283 EMERGENCY DEPT VISIT LOW MDM: CPT

## 2018-08-28 PROCEDURE — 84145 PROCALCITONIN (PCT): CPT | Performed by: PHYSICIAN ASSISTANT

## 2018-08-28 PROCEDURE — 83605 ASSAY OF LACTIC ACID: CPT | Performed by: PHYSICIAN ASSISTANT

## 2018-08-28 PROCEDURE — 81003 URINALYSIS AUTO W/O SCOPE: CPT | Performed by: PHYSICIAN ASSISTANT

## 2018-08-28 PROCEDURE — 80053 COMPREHEN METABOLIC PANEL: CPT | Performed by: PHYSICIAN ASSISTANT

## 2018-08-28 PROCEDURE — 84484 ASSAY OF TROPONIN QUANT: CPT | Performed by: PHYSICIAN ASSISTANT

## 2018-08-28 PROCEDURE — 83880 ASSAY OF NATRIURETIC PEPTIDE: CPT | Performed by: PHYSICIAN ASSISTANT

## 2018-08-28 PROCEDURE — 93010 ELECTROCARDIOGRAM REPORT: CPT | Performed by: INTERNAL MEDICINE

## 2018-08-28 PROCEDURE — 85025 COMPLETE CBC W/AUTO DIFF WBC: CPT | Performed by: PHYSICIAN ASSISTANT

## 2018-08-28 PROCEDURE — 87040 BLOOD CULTURE FOR BACTERIA: CPT | Performed by: PHYSICIAN ASSISTANT

## 2018-08-28 PROCEDURE — 93005 ELECTROCARDIOGRAM TRACING: CPT | Performed by: PHYSICIAN ASSISTANT

## 2018-08-28 RX ORDER — SODIUM CHLORIDE 0.9 % (FLUSH) 0.9 %
10 SYRINGE (ML) INJECTION AS NEEDED
Status: DISCONTINUED | OUTPATIENT
Start: 2018-08-28 | End: 2018-08-29 | Stop reason: HOSPADM

## 2018-08-29 ENCOUNTER — APPOINTMENT (OUTPATIENT)
Dept: GENERAL RADIOLOGY | Facility: HOSPITAL | Age: 83
End: 2018-08-29

## 2018-08-29 ENCOUNTER — APPOINTMENT (OUTPATIENT)
Dept: CT IMAGING | Facility: HOSPITAL | Age: 83
End: 2018-08-29

## 2018-08-29 VITALS
DIASTOLIC BLOOD PRESSURE: 79 MMHG | SYSTOLIC BLOOD PRESSURE: 170 MMHG | TEMPERATURE: 98.3 F | OXYGEN SATURATION: 93 % | HEIGHT: 67 IN | HEART RATE: 66 BPM | BODY MASS INDEX: 25.11 KG/M2 | WEIGHT: 160 LBS | RESPIRATION RATE: 16 BRPM

## 2018-08-29 LAB
ALBUMIN SERPL-MCNC: 4.2 G/DL (ref 3.5–5.2)
ALBUMIN/GLOB SERPL: 1.2 G/DL
ALP SERPL-CCNC: 96 U/L (ref 39–117)
ALT SERPL W P-5'-P-CCNC: 23 U/L (ref 1–41)
ANION GAP SERPL CALCULATED.3IONS-SCNC: 11.3 MMOL/L
AST SERPL-CCNC: 31 U/L (ref 1–40)
BILIRUB SERPL-MCNC: 0.4 MG/DL (ref 0.1–1.2)
BUN BLD-MCNC: 12 MG/DL (ref 8–23)
BUN/CREAT SERPL: 15.2 (ref 7–25)
CALCIUM SPEC-SCNC: 9.5 MG/DL (ref 8.6–10.5)
CHLORIDE SERPL-SCNC: 94 MMOL/L (ref 98–107)
CO2 SERPL-SCNC: 24.7 MMOL/L (ref 22–29)
CREAT BLD-MCNC: 0.79 MG/DL (ref 0.76–1.27)
D-LACTATE SERPL-SCNC: 0.9 MMOL/L (ref 0.5–2)
GFR SERPL CREATININE-BSD FRML MDRD: 94 ML/MIN/1.73
GLOBULIN UR ELPH-MCNC: 3.4 GM/DL
GLUCOSE BLD-MCNC: 103 MG/DL (ref 65–99)
NT-PROBNP SERPL-MCNC: 359 PG/ML (ref 0–1800)
POTASSIUM BLD-SCNC: 4.3 MMOL/L (ref 3.5–5.2)
PROCALCITONIN SERPL-MCNC: 0.05 NG/ML (ref 0.1–0.25)
PROT SERPL-MCNC: 7.6 G/DL (ref 6–8.5)
SODIUM BLD-SCNC: 130 MMOL/L (ref 136–145)
TROPONIN T SERPL-MCNC: <0.01 NG/ML (ref 0–0.03)

## 2018-08-29 PROCEDURE — 0 IOPAMIDOL PER 1 ML: Performed by: EMERGENCY MEDICINE

## 2018-08-29 PROCEDURE — 71046 X-RAY EXAM CHEST 2 VIEWS: CPT

## 2018-08-29 PROCEDURE — 71275 CT ANGIOGRAPHY CHEST: CPT

## 2018-08-29 RX ORDER — TRAMADOL HYDROCHLORIDE 50 MG/1
50 TABLET ORAL EVERY 6 HOURS PRN
Qty: 20 TABLET | Refills: 0 | Status: SHIPPED | OUTPATIENT
Start: 2018-08-29 | End: 2018-08-30

## 2018-08-29 RX ADMIN — IOPAMIDOL 95 ML: 755 INJECTION, SOLUTION INTRAVENOUS at 01:21

## 2018-08-30 ENCOUNTER — HOSPITAL ENCOUNTER (EMERGENCY)
Facility: HOSPITAL | Age: 83
Discharge: HOME | End: 2018-08-30
Attending: EMERGENCY MEDICINE | Admitting: EMERGENCY MEDICINE

## 2018-08-30 ENCOUNTER — APPOINTMENT (OUTPATIENT)
Dept: GENERAL RADIOLOGY | Facility: HOSPITAL | Age: 83
End: 2018-08-30

## 2018-08-30 ENCOUNTER — HOSPITAL ENCOUNTER (INPATIENT)
Facility: HOSPITAL | Age: 83
LOS: 3 days | Discharge: HOME | End: 2018-09-02
Attending: EMERGENCY MEDICINE | Admitting: HOSPITALIST

## 2018-08-30 VITALS
HEIGHT: 67 IN | WEIGHT: 160 LBS | RESPIRATION RATE: 16 BRPM | BODY MASS INDEX: 25.11 KG/M2 | DIASTOLIC BLOOD PRESSURE: 84 MMHG | TEMPERATURE: 97.6 F | SYSTOLIC BLOOD PRESSURE: 158 MMHG | HEART RATE: 62 BPM | OXYGEN SATURATION: 97 %

## 2018-08-30 DIAGNOSIS — F19.982 DRUG INDUCED INSOMNIA (HCC): Primary | ICD-10-CM

## 2018-08-30 DIAGNOSIS — E87.1 ACUTE HYPONATREMIA: ICD-10-CM

## 2018-08-30 DIAGNOSIS — R26.2 DIFFICULTY WALKING: ICD-10-CM

## 2018-08-30 DIAGNOSIS — R09.1 PLEURISY: ICD-10-CM

## 2018-08-30 DIAGNOSIS — E86.0 DEHYDRATION: Primary | ICD-10-CM

## 2018-08-30 DIAGNOSIS — E87.1 HYPONATREMIA: ICD-10-CM

## 2018-08-30 LAB
ALBUMIN SERPL-MCNC: 3.8 G/DL (ref 3.5–5.2)
ALBUMIN/GLOB SERPL: 1.3 G/DL
ALP SERPL-CCNC: 85 U/L (ref 39–117)
ALT SERPL W P-5'-P-CCNC: 21 U/L (ref 1–41)
ANION GAP SERPL CALCULATED.3IONS-SCNC: 11.1 MMOL/L
AST SERPL-CCNC: 27 U/L (ref 1–40)
BASOPHILS # BLD AUTO: 0.02 10*3/MM3 (ref 0–0.2)
BASOPHILS NFR BLD AUTO: 0.3 % (ref 0–1.5)
BILIRUB SERPL-MCNC: 0.7 MG/DL (ref 0.1–1.2)
BILIRUB UR QL STRIP: NEGATIVE
BUN BLD-MCNC: 10 MG/DL (ref 8–23)
BUN/CREAT SERPL: 18.9 (ref 7–25)
CALCIUM SPEC-SCNC: 8.8 MG/DL (ref 8.6–10.5)
CHLORIDE SERPL-SCNC: 87 MMOL/L (ref 98–107)
CLARITY UR: CLEAR
CO2 SERPL-SCNC: 23.9 MMOL/L (ref 22–29)
COLOR UR: YELLOW
CREAT BLD-MCNC: 0.53 MG/DL (ref 0.76–1.27)
DEPRECATED RDW RBC AUTO: 39.3 FL (ref 37–54)
EOSINOPHIL # BLD AUTO: 0.17 10*3/MM3 (ref 0–0.7)
EOSINOPHIL NFR BLD AUTO: 2.7 % (ref 0.3–6.2)
ERYTHROCYTE [DISTWIDTH] IN BLOOD BY AUTOMATED COUNT: 12.8 % (ref 11.5–14.5)
GFR SERPL CREATININE-BSD FRML MDRD: 149 ML/MIN/1.73
GLOBULIN UR ELPH-MCNC: 3 GM/DL
GLUCOSE BLD-MCNC: 108 MG/DL (ref 65–99)
GLUCOSE UR STRIP-MCNC: NEGATIVE MG/DL
HCT VFR BLD AUTO: 31.3 % (ref 40.4–52.2)
HGB BLD-MCNC: 10.8 G/DL (ref 13.7–17.6)
HGB UR QL STRIP.AUTO: NEGATIVE
IMM GRANULOCYTES # BLD: 0.01 10*3/MM3 (ref 0–0.03)
IMM GRANULOCYTES NFR BLD: 0.2 % (ref 0–0.5)
KETONES UR QL STRIP: ABNORMAL
LEUKOCYTE ESTERASE UR QL STRIP.AUTO: NEGATIVE
LIPASE SERPL-CCNC: 22 U/L (ref 13–60)
LYMPHOCYTES # BLD AUTO: 0.79 10*3/MM3 (ref 0.9–4.8)
LYMPHOCYTES NFR BLD AUTO: 12.5 % (ref 19.6–45.3)
MCH RBC QN AUTO: 29 PG (ref 27–32.7)
MCHC RBC AUTO-ENTMCNC: 34.5 G/DL (ref 32.6–36.4)
MCV RBC AUTO: 83.9 FL (ref 79.8–96.2)
MONOCYTES # BLD AUTO: 0.71 10*3/MM3 (ref 0.2–1.2)
MONOCYTES NFR BLD AUTO: 11.3 % (ref 5–12)
NEUTROPHILS # BLD AUTO: 4.61 10*3/MM3 (ref 1.9–8.1)
NEUTROPHILS NFR BLD AUTO: 73.2 % (ref 42.7–76)
NITRITE UR QL STRIP: NEGATIVE
PH UR STRIP.AUTO: 7 [PH] (ref 5–8)
PLATELET # BLD AUTO: 243 10*3/MM3 (ref 140–500)
PMV BLD AUTO: 8.3 FL (ref 6–12)
POTASSIUM BLD-SCNC: 4.1 MMOL/L (ref 3.5–5.2)
PROT SERPL-MCNC: 6.8 G/DL (ref 6–8.5)
PROT UR QL STRIP: ABNORMAL
RBC # BLD AUTO: 3.73 10*6/MM3 (ref 4.6–6)
SODIUM BLD-SCNC: 122 MMOL/L (ref 136–145)
SP GR UR STRIP: 1.02 (ref 1–1.03)
TROPONIN T SERPL-MCNC: <0.01 NG/ML (ref 0–0.03)
UROBILINOGEN UR QL STRIP: ABNORMAL
WBC NRBC COR # BLD: 6.3 10*3/MM3 (ref 4.5–10.7)

## 2018-08-30 PROCEDURE — 80053 COMPREHEN METABOLIC PANEL: CPT | Performed by: EMERGENCY MEDICINE

## 2018-08-30 PROCEDURE — 84484 ASSAY OF TROPONIN QUANT: CPT | Performed by: EMERGENCY MEDICINE

## 2018-08-30 PROCEDURE — 99285 EMERGENCY DEPT VISIT HI MDM: CPT

## 2018-08-30 PROCEDURE — 81003 URINALYSIS AUTO W/O SCOPE: CPT | Performed by: EMERGENCY MEDICINE

## 2018-08-30 PROCEDURE — 83690 ASSAY OF LIPASE: CPT | Performed by: EMERGENCY MEDICINE

## 2018-08-30 PROCEDURE — 99282 EMERGENCY DEPT VISIT SF MDM: CPT

## 2018-08-30 PROCEDURE — 85025 COMPLETE CBC W/AUTO DIFF WBC: CPT | Performed by: EMERGENCY MEDICINE

## 2018-08-30 PROCEDURE — 25010000002 ONDANSETRON PER 1 MG: Performed by: EMERGENCY MEDICINE

## 2018-08-30 PROCEDURE — 71046 X-RAY EXAM CHEST 2 VIEWS: CPT

## 2018-08-30 RX ORDER — SODIUM CHLORIDE 0.9 % (FLUSH) 0.9 %
10 SYRINGE (ML) INJECTION AS NEEDED
Status: DISCONTINUED | OUTPATIENT
Start: 2018-08-30 | End: 2018-09-02 | Stop reason: HOSPADM

## 2018-08-30 RX ORDER — ONDANSETRON 2 MG/ML
4 INJECTION INTRAMUSCULAR; INTRAVENOUS ONCE
Status: COMPLETED | OUTPATIENT
Start: 2018-08-30 | End: 2018-08-30

## 2018-08-30 RX ORDER — OXYCODONE HYDROCHLORIDE AND ACETAMINOPHEN 5; 325 MG/1; MG/1
1 TABLET ORAL EVERY 6 HOURS PRN
Qty: 20 TABLET | Refills: 0 | Status: SHIPPED | OUTPATIENT
Start: 2018-08-30 | End: 2018-09-06

## 2018-08-30 RX ADMIN — SODIUM CHLORIDE 1000 ML: 9 INJECTION, SOLUTION INTRAVENOUS at 22:48

## 2018-08-30 RX ADMIN — ONDANSETRON 4 MG: 2 INJECTION INTRAMUSCULAR; INTRAVENOUS at 22:54

## 2018-08-31 PROBLEM — R82.4 KETONURIA: Status: ACTIVE | Noted: 2018-08-31

## 2018-08-31 LAB
ALBUMIN SERPL-MCNC: 3.9 G/DL (ref 3.5–5.2)
ANION GAP SERPL CALCULATED.3IONS-SCNC: 9 MMOL/L
BASOPHILS # BLD AUTO: 0.02 10*3/MM3 (ref 0–0.2)
BASOPHILS NFR BLD AUTO: 0.3 % (ref 0–1.5)
BUN BLD-MCNC: 7 MG/DL (ref 8–23)
BUN/CREAT SERPL: 12.7 (ref 7–25)
CALCIUM SPEC-SCNC: 8.6 MG/DL (ref 8.6–10.5)
CHLORIDE SERPL-SCNC: 90 MMOL/L (ref 98–107)
CHLORIDE UR-SCNC: 81 MMOL/L
CO2 SERPL-SCNC: 24 MMOL/L (ref 22–29)
CREAT BLD-MCNC: 0.55 MG/DL (ref 0.76–1.27)
CREAT UR-MCNC: 46.3 MG/DL
DEPRECATED RDW RBC AUTO: 39.4 FL (ref 37–54)
EOSINOPHIL # BLD AUTO: 0.12 10*3/MM3 (ref 0–0.7)
EOSINOPHIL NFR BLD AUTO: 2 % (ref 0.3–6.2)
ERYTHROCYTE [DISTWIDTH] IN BLOOD BY AUTOMATED COUNT: 13 % (ref 11.5–14.5)
GFR SERPL CREATININE-BSD FRML MDRD: 143 ML/MIN/1.73
GLUCOSE BLD-MCNC: 98 MG/DL (ref 65–99)
HCT VFR BLD AUTO: 32.4 % (ref 40.4–52.2)
HGB BLD-MCNC: 11.2 G/DL (ref 13.7–17.6)
IMM GRANULOCYTES # BLD: 0 10*3/MM3 (ref 0–0.03)
IMM GRANULOCYTES NFR BLD: 0 % (ref 0–0.5)
LYMPHOCYTES # BLD AUTO: 0.85 10*3/MM3 (ref 0.9–4.8)
LYMPHOCYTES NFR BLD AUTO: 14.2 % (ref 19.6–45.3)
MCH RBC QN AUTO: 29 PG (ref 27–32.7)
MCHC RBC AUTO-ENTMCNC: 34.6 G/DL (ref 32.6–36.4)
MCV RBC AUTO: 83.9 FL (ref 79.8–96.2)
MONOCYTES # BLD AUTO: 0.55 10*3/MM3 (ref 0.2–1.2)
MONOCYTES NFR BLD AUTO: 9.2 % (ref 5–12)
NEUTROPHILS # BLD AUTO: 4.46 10*3/MM3 (ref 1.9–8.1)
NEUTROPHILS NFR BLD AUTO: 74.3 % (ref 42.7–76)
OSMOLALITY SERPL: 260 MOSM/KG (ref 280–301)
OSMOLALITY UR: 328 MOSM/KG
PHOSPHATE SERPL-MCNC: 2.8 MG/DL (ref 2.5–4.5)
PLATELET # BLD AUTO: 276 10*3/MM3 (ref 140–500)
PMV BLD AUTO: 8.5 FL (ref 6–12)
POTASSIUM BLD-SCNC: 4 MMOL/L (ref 3.5–5.2)
RBC # BLD AUTO: 3.86 10*6/MM3 (ref 4.6–6)
SODIUM BLD-SCNC: 122 MMOL/L (ref 136–145)
SODIUM BLD-SCNC: 123 MMOL/L (ref 136–145)
SODIUM UR-SCNC: 75 MMOL/L
TSH SERPL DL<=0.05 MIU/L-ACNC: 1.65 MIU/ML (ref 0.27–4.2)
URATE SERPL-MCNC: 2.6 MG/DL (ref 3.4–7)
WBC NRBC COR # BLD: 6 10*3/MM3 (ref 4.5–10.7)

## 2018-08-31 PROCEDURE — 84550 ASSAY OF BLOOD/URIC ACID: CPT | Performed by: INTERNAL MEDICINE

## 2018-08-31 PROCEDURE — 80069 RENAL FUNCTION PANEL: CPT | Performed by: INTERNAL MEDICINE

## 2018-08-31 PROCEDURE — 84300 ASSAY OF URINE SODIUM: CPT | Performed by: INTERNAL MEDICINE

## 2018-08-31 PROCEDURE — 85025 COMPLETE CBC W/AUTO DIFF WBC: CPT | Performed by: HOSPITALIST

## 2018-08-31 PROCEDURE — 83930 ASSAY OF BLOOD OSMOLALITY: CPT | Performed by: INTERNAL MEDICINE

## 2018-08-31 PROCEDURE — 83935 ASSAY OF URINE OSMOLALITY: CPT | Performed by: INTERNAL MEDICINE

## 2018-08-31 PROCEDURE — 84295 ASSAY OF SERUM SODIUM: CPT | Performed by: INTERNAL MEDICINE

## 2018-08-31 PROCEDURE — 84443 ASSAY THYROID STIM HORMONE: CPT | Performed by: INTERNAL MEDICINE

## 2018-08-31 PROCEDURE — 97110 THERAPEUTIC EXERCISES: CPT

## 2018-08-31 PROCEDURE — 82570 ASSAY OF URINE CREATININE: CPT | Performed by: INTERNAL MEDICINE

## 2018-08-31 PROCEDURE — 97161 PT EVAL LOW COMPLEX 20 MIN: CPT

## 2018-08-31 PROCEDURE — 82436 ASSAY OF URINE CHLORIDE: CPT | Performed by: INTERNAL MEDICINE

## 2018-08-31 RX ORDER — AMLODIPINE BESYLATE 5 MG/1
5 TABLET ORAL
Status: DISCONTINUED | OUTPATIENT
Start: 2018-08-31 | End: 2018-09-02 | Stop reason: HOSPADM

## 2018-08-31 RX ORDER — ACETAMINOPHEN 325 MG/1
650 TABLET ORAL EVERY 4 HOURS PRN
Status: DISCONTINUED | OUTPATIENT
Start: 2018-08-31 | End: 2018-09-02 | Stop reason: HOSPADM

## 2018-08-31 RX ORDER — ACETAMINOPHEN 325 MG/1
650 TABLET ORAL EVERY 4 HOURS PRN
Status: DISCONTINUED | OUTPATIENT
Start: 2018-08-31 | End: 2018-08-31 | Stop reason: SDUPTHER

## 2018-08-31 RX ORDER — ONDANSETRON 4 MG/1
4 TABLET, FILM COATED ORAL EVERY 6 HOURS PRN
Status: DISCONTINUED | OUTPATIENT
Start: 2018-08-31 | End: 2018-09-02 | Stop reason: HOSPADM

## 2018-08-31 RX ORDER — ASPIRIN 81 MG/1
81 TABLET ORAL DAILY
Status: DISCONTINUED | OUTPATIENT
Start: 2018-08-31 | End: 2018-09-02 | Stop reason: HOSPADM

## 2018-08-31 RX ORDER — PANTOPRAZOLE SODIUM 40 MG/1
40 TABLET, DELAYED RELEASE ORAL EVERY MORNING
Status: DISCONTINUED | OUTPATIENT
Start: 2018-08-31 | End: 2018-09-02 | Stop reason: HOSPADM

## 2018-08-31 RX ORDER — SODIUM CHLORIDE 0.9 % (FLUSH) 0.9 %
1-10 SYRINGE (ML) INJECTION AS NEEDED
Status: DISCONTINUED | OUTPATIENT
Start: 2018-08-31 | End: 2018-09-02 | Stop reason: HOSPADM

## 2018-08-31 RX ORDER — ONDANSETRON 4 MG/1
4 TABLET, ORALLY DISINTEGRATING ORAL EVERY 6 HOURS PRN
Status: DISCONTINUED | OUTPATIENT
Start: 2018-08-31 | End: 2018-09-02 | Stop reason: HOSPADM

## 2018-08-31 RX ORDER — DOXYCYCLINE 100 MG/1
100 CAPSULE ORAL EVERY 12 HOURS SCHEDULED
Status: COMPLETED | OUTPATIENT
Start: 2018-08-31 | End: 2018-08-31

## 2018-08-31 RX ORDER — ONDANSETRON 2 MG/ML
4 INJECTION INTRAMUSCULAR; INTRAVENOUS EVERY 6 HOURS PRN
Status: DISCONTINUED | OUTPATIENT
Start: 2018-08-31 | End: 2018-09-02 | Stop reason: HOSPADM

## 2018-08-31 RX ORDER — OXYCODONE HYDROCHLORIDE AND ACETAMINOPHEN 5; 325 MG/1; MG/1
1 TABLET ORAL EVERY 6 HOURS PRN
Status: DISCONTINUED | OUTPATIENT
Start: 2018-08-31 | End: 2018-09-02 | Stop reason: HOSPADM

## 2018-08-31 RX ORDER — SODIUM CHLORIDE 9 MG/ML
100 INJECTION, SOLUTION INTRAVENOUS CONTINUOUS
Status: DISCONTINUED | OUTPATIENT
Start: 2018-08-31 | End: 2018-09-01

## 2018-08-31 RX ORDER — ROSUVASTATIN CALCIUM 5 MG/1
5 TABLET, COATED ORAL DAILY
Status: DISCONTINUED | OUTPATIENT
Start: 2018-08-31 | End: 2018-09-02 | Stop reason: HOSPADM

## 2018-08-31 RX ORDER — CLOPIDOGREL BISULFATE 75 MG/1
75 TABLET ORAL EVERY OTHER DAY
Status: DISCONTINUED | OUTPATIENT
Start: 2018-08-31 | End: 2018-09-02 | Stop reason: HOSPADM

## 2018-08-31 RX ORDER — METOPROLOL SUCCINATE 25 MG/1
25 TABLET, EXTENDED RELEASE ORAL DAILY
Status: DISCONTINUED | OUTPATIENT
Start: 2018-08-31 | End: 2018-09-02 | Stop reason: HOSPADM

## 2018-08-31 RX ADMIN — PANTOPRAZOLE SODIUM 40 MG: 40 TABLET, DELAYED RELEASE ORAL at 06:32

## 2018-08-31 RX ADMIN — AMLODIPINE BESYLATE 5 MG: 5 TABLET ORAL at 10:35

## 2018-08-31 RX ADMIN — SODIUM CHLORIDE 100 ML/HR: 9 INJECTION, SOLUTION INTRAVENOUS at 01:37

## 2018-08-31 RX ADMIN — DOXYCYCLINE 100 MG: 100 CAPSULE ORAL at 10:35

## 2018-08-31 RX ADMIN — ROSUVASTATIN CALCIUM 5 MG: 5 TABLET, FILM COATED ORAL at 10:34

## 2018-08-31 RX ADMIN — CLOPIDOGREL 75 MG: 75 TABLET, FILM COATED ORAL at 10:35

## 2018-08-31 RX ADMIN — SODIUM CHLORIDE 100 ML/HR: 9 INJECTION, SOLUTION INTRAVENOUS at 12:36

## 2018-08-31 RX ADMIN — ACETAMINOPHEN 650 MG: 325 TABLET, FILM COATED ORAL at 12:36

## 2018-08-31 RX ADMIN — OXYCODONE HYDROCHLORIDE AND ACETAMINOPHEN 1 TABLET: 5; 325 TABLET ORAL at 16:57

## 2018-08-31 RX ADMIN — DOXYCYCLINE 100 MG: 100 CAPSULE ORAL at 20:22

## 2018-08-31 RX ADMIN — APIXABAN 5 MG: 5 TABLET, FILM COATED ORAL at 10:35

## 2018-08-31 RX ADMIN — ASPIRIN 81 MG: 81 TABLET, DELAYED RELEASE ORAL at 10:36

## 2018-08-31 RX ADMIN — APIXABAN 5 MG: 5 TABLET, FILM COATED ORAL at 20:22

## 2018-08-31 RX ADMIN — ACETAMINOPHEN 650 MG: 325 TABLET, FILM COATED ORAL at 01:43

## 2018-08-31 RX ADMIN — METOPROLOL SUCCINATE 25 MG: 25 TABLET, FILM COATED, EXTENDED RELEASE ORAL at 10:34

## 2018-09-01 LAB
ALBUMIN SERPL-MCNC: 3.8 G/DL (ref 3.5–5.2)
ANION GAP SERPL CALCULATED.3IONS-SCNC: 9.4 MMOL/L
BASOPHILS # BLD AUTO: 0.02 10*3/MM3 (ref 0–0.2)
BASOPHILS NFR BLD AUTO: 0.3 % (ref 0–1.5)
BUN BLD-MCNC: 8 MG/DL (ref 8–23)
BUN/CREAT SERPL: 15.7 (ref 7–25)
CALCIUM SPEC-SCNC: 8.6 MG/DL (ref 8.6–10.5)
CHLORIDE SERPL-SCNC: 92 MMOL/L (ref 98–107)
CO2 SERPL-SCNC: 23.6 MMOL/L (ref 22–29)
CREAT BLD-MCNC: 0.51 MG/DL (ref 0.76–1.27)
DEPRECATED RDW RBC AUTO: 40.4 FL (ref 37–54)
EOSINOPHIL # BLD AUTO: 0.12 10*3/MM3 (ref 0–0.7)
EOSINOPHIL NFR BLD AUTO: 1.7 % (ref 0.3–6.2)
ERYTHROCYTE [DISTWIDTH] IN BLOOD BY AUTOMATED COUNT: 12.8 % (ref 11.5–14.5)
GFR SERPL CREATININE-BSD FRML MDRD: >150 ML/MIN/1.73
GLUCOSE BLD-MCNC: 91 MG/DL (ref 65–99)
HCT VFR BLD AUTO: 34.2 % (ref 40.4–52.2)
HGB BLD-MCNC: 11.2 G/DL (ref 13.7–17.6)
IMM GRANULOCYTES # BLD: 0 10*3/MM3 (ref 0–0.03)
IMM GRANULOCYTES NFR BLD: 0 % (ref 0–0.5)
LYMPHOCYTES # BLD AUTO: 0.97 10*3/MM3 (ref 0.9–4.8)
LYMPHOCYTES NFR BLD AUTO: 13.5 % (ref 19.6–45.3)
MCH RBC QN AUTO: 28.1 PG (ref 27–32.7)
MCHC RBC AUTO-ENTMCNC: 32.7 G/DL (ref 32.6–36.4)
MCV RBC AUTO: 85.9 FL (ref 79.8–96.2)
MONOCYTES # BLD AUTO: 0.85 10*3/MM3 (ref 0.2–1.2)
MONOCYTES NFR BLD AUTO: 11.8 % (ref 5–12)
NEUTROPHILS # BLD AUTO: 5.23 10*3/MM3 (ref 1.9–8.1)
NEUTROPHILS NFR BLD AUTO: 72.7 % (ref 42.7–76)
PHOSPHATE SERPL-MCNC: 2.5 MG/DL (ref 2.5–4.5)
PLATELET # BLD AUTO: 244 10*3/MM3 (ref 140–500)
PMV BLD AUTO: 8.8 FL (ref 6–12)
POTASSIUM BLD-SCNC: 4.1 MMOL/L (ref 3.5–5.2)
RBC # BLD AUTO: 3.98 10*6/MM3 (ref 4.6–6)
SODIUM BLD-SCNC: 125 MMOL/L (ref 136–145)
SODIUM BLD-SCNC: 125 MMOL/L (ref 136–145)
WBC NRBC COR # BLD: 7.19 10*3/MM3 (ref 4.5–10.7)

## 2018-09-01 PROCEDURE — 85025 COMPLETE CBC W/AUTO DIFF WBC: CPT | Performed by: HOSPITALIST

## 2018-09-01 PROCEDURE — 80069 RENAL FUNCTION PANEL: CPT | Performed by: INTERNAL MEDICINE

## 2018-09-01 PROCEDURE — 97110 THERAPEUTIC EXERCISES: CPT

## 2018-09-01 PROCEDURE — 84295 ASSAY OF SERUM SODIUM: CPT | Performed by: INTERNAL MEDICINE

## 2018-09-01 PROCEDURE — 94640 AIRWAY INHALATION TREATMENT: CPT

## 2018-09-01 RX ORDER — ALBUTEROL SULFATE 2.5 MG/3ML
2.5 SOLUTION RESPIRATORY (INHALATION) EVERY 4 HOURS PRN
Status: DISCONTINUED | OUTPATIENT
Start: 2018-09-01 | End: 2018-09-02 | Stop reason: HOSPADM

## 2018-09-01 RX ORDER — BUDESONIDE AND FORMOTEROL FUMARATE DIHYDRATE 80; 4.5 UG/1; UG/1
2 AEROSOL RESPIRATORY (INHALATION)
Status: DISCONTINUED | OUTPATIENT
Start: 2018-09-01 | End: 2018-09-02 | Stop reason: HOSPADM

## 2018-09-01 RX ORDER — DEMECLOCYCLINE HYDROCHLORIDE 150 MG/1
300 TABLET, FILM COATED ORAL EVERY 12 HOURS SCHEDULED
Status: DISCONTINUED | OUTPATIENT
Start: 2018-09-01 | End: 2018-09-02 | Stop reason: HOSPADM

## 2018-09-01 RX ADMIN — APIXABAN 5 MG: 5 TABLET, FILM COATED ORAL at 20:52

## 2018-09-01 RX ADMIN — PANTOPRAZOLE SODIUM 40 MG: 40 TABLET, DELAYED RELEASE ORAL at 09:06

## 2018-09-01 RX ADMIN — METOPROLOL SUCCINATE 25 MG: 25 TABLET, FILM COATED, EXTENDED RELEASE ORAL at 09:06

## 2018-09-01 RX ADMIN — ASPIRIN 81 MG: 81 TABLET, DELAYED RELEASE ORAL at 09:06

## 2018-09-01 RX ADMIN — AMLODIPINE BESYLATE 5 MG: 5 TABLET ORAL at 09:06

## 2018-09-01 RX ADMIN — SODIUM CHLORIDE 100 ML/HR: 9 INJECTION, SOLUTION INTRAVENOUS at 09:09

## 2018-09-01 RX ADMIN — ROSUVASTATIN CALCIUM 5 MG: 5 TABLET, FILM COATED ORAL at 09:06

## 2018-09-01 RX ADMIN — BUDESONIDE AND FORMOTEROL FUMARATE DIHYDRATE 2 PUFF: 80; 4.5 AEROSOL RESPIRATORY (INHALATION) at 20:41

## 2018-09-01 RX ADMIN — ACETAMINOPHEN 650 MG: 325 TABLET, FILM COATED ORAL at 09:06

## 2018-09-01 RX ADMIN — DEMECLOCYCLINE HYDROCHLORIDE 300 MG: 150 TABLET ORAL at 20:52

## 2018-09-01 RX ADMIN — APIXABAN 5 MG: 5 TABLET, FILM COATED ORAL at 09:07

## 2018-09-01 NOTE — THERAPY TREATMENT NOTE
Acute Care - Physical Therapy Treatment Note  [facility]     Patient Name: Adriel Mackenzie  : 1935  MRN: [id number]  Today's Date: 2018  Onset of Illness/Injury or Date of Surgery: 18  Date of Referral to PT: 18  Referring Physician: Junior Bacon MD    Admit Date: 2018    Visit Dx:    ICD-10-CM ICD-9-CM   1. Dehydration E86.0 276.51   2. Acute hyponatremia E87.1 276.1   3. Difficulty walking R26.2 719.7     Patient Active Problem List   Diagnosis   • GERD (gastroesophageal reflux disease)   • Hyperlipidemia   • Pleural effusion   • History of coronary artery stent placement   • Essential hypertension   • Pneumonia of left lower lobe due to infectious organism (CMS/HCC)   • Hyponatremia   • Pleural thickening   • SIADH (syndrome of inappropriate ADH production) (CMS/HCC)   • Acute deep vein thrombosis (DVT) of distal vein of both lower extremities (CMS/HCC)   • Dehydration   • Ketonuria       Therapy Treatment          Rehabilitation Treatment Summary     Row Name 18 1600             Treatment Time/Intention    Discipline physical therapist  -SP      Document Type therapy note (daily note)  -SP      Subjective Information no complaints  -SP      Mode of Treatment physical therapy  -SP      Patient Effort good  -SP      Existing Precautions/Restrictions fall  -SP      Recorded by [SP] Ling South, PT 18 1609      Row Name 18 1600             Cognitive Assessment/Intervention- PT/OT    Orientation Status (Cognition) oriented x 4  -SP      Follows Commands (Cognition) WNL  -SP      Recorded by [SP] Ling South, PT 18 1609      Row Name 18 1600             Safety Issues, Functional Mobility    Safety Issues Affecting Function (Mobility) insight into deficits/self awareness  -SP      Impairments Affecting Function (Mobility) balance  -SP      Recorded by [SP] Ling South, PT 18 1609      Row Name 18 1600             Transfer  Assessment/Treatment    Transfer Assessment/Treatment sit-stand transfer;stand-sit transfer  -SP      Recorded by [SP] Ling South, PT 09/01/18 1609      Row Name 09/01/18 1600             Sit-Stand Transfer    Sit-Stand [place] (Transfers) supervision  -SP      Recorded by [SP] Ling South, PT 09/01/18 1609      Row Name 09/01/18 1600             Stand-Sit Transfer    Stand-Sit [place] (Transfers) supervision  -SP      Recorded by [SP] Ling South, PT 09/01/18 1609      Row Name 09/01/18 1600             Gait/Stairs Assessment/Training    Gait/Stairs Assessment/Training gait/ambulation independence  -SP      [place] Level (Gait) supervision;contact guard  -SP      Distance in Feet (Gait) 400  -SP      Pattern (Gait) swing-through  -SP      Deviations/Abnormal Patterns (Gait) base of support, wide;mookie decreased;bilateral deviations  -SP      Bilateral Gait Deviations heel strike decreased   b glute med lurch   -SP      Recorded by [SP] Ling South, PT 09/01/18 1609      Row Name 09/01/18 1600             Motor Skills Assessment/Interventions    Additional Documentation Balance Interventions (Group)  -SP      Recorded by [SP] Ling South, PT 09/01/18 1609      Row Name 09/01/18 1600             Balance Interventions    Training Strategies (Balance) in room at counter top: pt uses R hand for balance: side step 5 ft b x 4, toe/heel raises x 10 , sls 5 sec B x 5 ,   -SP      Recorded by [SP] Ling South, PT 09/01/18 1609      Row Name 09/01/18 1600             Positioning and Restraints    Pre-Treatment Position sitting in chair/recliner  -SP      Post Treatment Position other  -SP      In Chair with nsg   headed to shower with nsg assist   -SP      Recorded by [SP] Ling South, PT 09/01/18 1609      Row Name 09/01/18 1600             Pain Assessment    Additional Documentation Pain Scale: Numbers Pre/Post-Treatment (Group)  -SP      Recorded by [SP] Ling South  RAIZA, PT 09/01/18 1609      Row Name 09/01/18 1600             Pain Scale: Numbers Pre/Post-Treatment    Pain Scale: Numbers, Pretreatment 0/10 - no pain   but did have R low back pain   -SP      Recorded by [SP] Ling South, PT 09/01/18 1609        User Key  (r) = Recorded By, (t) = Taken By, (c) = Cosigned By    Initials Name Effective Dates Discipline    SP Ling South, PT 04/03/18 -  PT                     Physical Therapy Education     Title: PT OT SLP Therapies (Active)     Topic: Physical Therapy (Active)     Point: Mobility training (Active)    Learning Progress Summary     Learner Status Readiness Method Response Comment Documented by    Patient Active Acceptance E,D NR  DB 08/31/18 1053          Point: Home exercise program (Done)    Learning Progress Summary     Learner Status Readiness Method Response Comment Documented by    Patient Done Acceptance E,TB VU,NR,DU started balance ex/practiced SP 09/01/18 1610                      User Key     Initials Effective Dates Name Provider Type Discipline    SP 04/03/18 -  Ling South, PT Physical Therapist PT    DB 08/17/18 -  Flex Beckford, PT Student PT Student PT                    PT Recommendation and Plan     Plan of Care Reviewed With: patient  Progress: improving  Outcome Summary: Pt with improvement in gait to sba occ cga, mild balance impairment and started  standing balance ex.           Outcome Measures     Row Name 09/01/18 1600 08/31/18 1100          How much help from another person do you currently need...    Turning from your back to your side while in flat bed without using bedrails? 4  -SP 4  -EM (r) DB (t) EM (c)     Moving from lying on back to sitting on the side of a flat bed without bedrails? 4  -SP 4  -EM (r) DB (t) EM (c)     Moving to and from a bed to a chair (including a wheelchair)? 4  -SP 3  -EM (r) DB (t) EM (c)     Standing up from a chair using your arms (e.g., wheelchair, bedside chair)? 4  -SP 3  -EM (r) DB (t)  EM (c)     Climbing 3-5 steps with a railing? 3  -SP 3  -EM (r) DB (t) EM (c)     To walk in hospital room? 4  -SP 3  -EM (r) DB (t) EM (c)     AM-PAC 6 Clicks Score 23  -SP 20  -EM (r) DB (t)        Functional Assessment    Outcome Measure Options AM-PAC 6 Clicks Basic Mobility (PT)  -SP AM-PAC 6 Clicks Basic Mobility (PT)  -EM (r) DB (t) EM (c)       User Key  (r) = Recorded By, (t) = Taken By, (c) = Cosigned By    Initials Name Provider Type    EM Nivia Vicente, PT Physical Therapist    Ling Marquez, PT Physical Therapist    DB Flex Beckford, PT Student PT Student           Time Calculation:         PT Charges     Row Name 09/01/18 1611             Time Calculation    Start Time 1545  -SP      Stop Time 1605  -SP      Time Calculation (min) 20 min  -SP      PT Received On 09/01/18  -SP      PT - Next Appointment 09/02/18  -SP      PT Goal Re-Cert Due Date 09/07/18  -SP        User Key  (r) = Recorded By, (t) = Taken By, (c) = Cosigned By    Initials Name Provider Type    Ling Marquez, PT Physical Therapist        Therapy Suggested Charges     Code   Minutes Charges    None           Therapy Charges for Today     Code Description Service Date Service Provider Modifiers Qty    87519457670 HC PT THER PROC EA 15 MIN 9/1/2018 Ling South PT GP 1          PT G-Codes  Outcome Measure Options: AM-PAC 6 Clicks Basic Mobility (PT)    Ling South, REBECCA  9/1/2018

## 2018-09-01 NOTE — PROGRESS NOTES
LOS: 2 days     Name: Adriel Mackenzie  Age/Sex: 83 y.o. male  :  1935        PCP: Michelle Bowser APRN    Subjective   No new issues, back pain a little better today    General: No Fever or Chills, Cardiac: No Chest Pain or Palpitations, Resp: No Cough or SOA, GI: No Nausea, Vomiting, or Diarrhea and Other: No bleeding      amLODIPine 5 mg Oral Q24H   apixaban 5 mg Oral Q12H   aspirin 81 mg Oral Daily   budesonide-formoterol 2 puff Inhalation BID - RT   clopidogrel 75 mg Oral Every Other Day   metoprolol succinate XL 25 mg Oral Daily   pantoprazole 40 mg Oral QAM   rosuvastatin 5 mg Oral Daily       sodium chloride 100 mL/hr Last Rate: 100 mL/hr (18 09)       Objective   Vital Signs  Temp:  [97.6 °F (36.4 °C)-97.8 °F (36.6 °C)] 97.6 °F (36.4 °C)  Heart Rate:  [67-82] 77  Resp:  [18] 18  BP: (112-175)/(59-84) 175/84  Body mass index is 24.46 kg/m².    Intake/Output Summary (Last 24 hours) at 18 1326  Last data filed at 18 09   Gross per 24 hour   Intake              880 ml   Output             2275 ml   Net            -1395 ml       Physical Exam   Constitutional: He appears well-developed and well-nourished. No distress.   HENT:   Head: Normocephalic and atraumatic.   Eyes: Conjunctivae and EOM are normal.   Neck: Neck supple.   Cardiovascular: Normal rate, regular rhythm and normal heart sounds.    Pulmonary/Chest: Effort normal and breath sounds normal.   Abdominal: Soft. Bowel sounds are normal.   Musculoskeletal:   No paraspinal spasm, lots of thoracic muscle tightness   Skin: Skin is warm and dry.   Nursing note and vitals reviewed.        Results Review:       I reviewed the patient's new clinical results.    Results from last 7 days  Lab Units 18  0746 18  0841 18  2151 18  2332   WBC 10*3/mm3 7.19 6.00 6.30 6.29   HEMOGLOBIN g/dL 11.2* 11.2* 10.8* 11.3*   PLATELETS 10*3/mm3 244 276 243 259       Results from last 7 days  Lab Units  09/01/18  0746 09/01/18  0030 08/31/18  1604 08/31/18  0841 08/30/18  2151 08/28/18  2332   SODIUM mmol/L 125* 125* 122* 123* 122* 130*   POTASSIUM mmol/L 4.1  --   --  4.0 4.1 4.3   CHLORIDE mmol/L 92*  --   --  90* 87* 94*   CO2 mmol/L 23.6  --   --  24.0 23.9 24.7   BUN mg/dL 8  --   --  7* 10 12   CREATININE mg/dL 0.51*  --   --  0.55* 0.53* 0.79   CALCIUM mg/dL 8.6  --   --  8.6 8.8 9.5   PHOSPHORUS mg/dL 2.5  --   --  2.8  --   --    Estimated Creatinine Clearance: 71.4 mL/min (A) (by C-G formula based on SCr of 0.51 mg/dL (L)).      Assessment/Plan   Principal Problem:    Dehydration  Active Problems:    GERD (gastroesophageal reflux disease)    Hyperlipidemia    History of coronary artery stent placement    Essential hypertension    Hyponatremia    SIADH (syndrome of inappropriate ADH production) (CMS/HCC)    Acute deep vein thrombosis (DVT) of distal vein of both lower extremities (CMS/HCC)    Ketonuria      PLAN  - Na with some improvement with fluids but still remians low, may need tolvaptan will defer to nephrology  - k pad for back  - otherwise stable    Disposition  To be detemined      Miguel Angel Honeycutt MD  [facility]  09/01/18  1:26 PM

## 2018-09-01 NOTE — PLAN OF CARE
Problem: Patient Care Overview  Goal: Plan of Care Review  Outcome: Ongoing (interventions implemented as appropriate)   09/01/18 0313   Coping/Psychosocial   Plan of Care Reviewed With patient   Plan of Care Review   Progress improving   OTHER   Outcome Summary No acute events overnight. Pt is tolerating fluid restriction. Sodium levels increased as of 0030 (125). No complaints of pain. VSS       Problem: Fall Risk (Adult)  Goal: Absence of Fall  Outcome: Ongoing (interventions implemented as appropriate)      Problem: Fluid Volume Deficit (Adult)  Goal: Optimal Fluid Balance  Outcome: Ongoing (interventions implemented as appropriate)

## 2018-09-01 NOTE — PLAN OF CARE
Problem: Patient Care Overview  Goal: Plan of Care Review  Outcome: Ongoing (interventions implemented as appropriate)   09/01/18 9038   Coping/Psychosocial   Plan of Care Reviewed With patient   Plan of Care Review   Progress no change   OTHER   Outcome Summary VSS, intermitten c/o back pain, Tyelno given, K pac set up. Sodium levels are improving continue assessing labs, IVF's D/c today, 1200ml fluid restriction continued       Problem: Fall Risk (Adult)  Goal: Absence of Fall  Outcome: Ongoing (interventions implemented as appropriate)      Problem: Fluid Volume Deficit (Adult)  Goal: Optimal Fluid Balance  Outcome: Ongoing (interventions implemented as appropriate)

## 2018-09-01 NOTE — PLAN OF CARE
Problem: Patient Care Overview  Goal: Plan of Care Review   09/01/18 1601   Coping/Psychosocial   Plan of Care Reviewed With patient   Plan of Care Review   Progress improving   OTHER   Outcome Summary Pt with improvement in gait to sba occ cga, mild balance impairment and started standing balance ex.

## 2018-09-01 NOTE — PROGRESS NOTES
"   LOS: 2 days   Patient Care Team:  Michelle Bowser APRN as PCP - General (Family Medicine)  Michelle Bowser APRN as PCP - Claims Attributed  Hima Carney MD as Consulting Physician (Cardiology)  Luke Mares MD as Consulting Physician (Pulmonary Disease)    Chief Complaint/ Reason for encounter: Hyponatremia, SIADH  Chief Complaint   Patient presents with   • Vomiting   • Nausea   • Weakness - Generalized         Subjective     Medical history reviewed:  History of Present Illness    Subjective:  Symptoms:  Improved.  No shortness of breath or chest pain.  (He feels well today and denies any new complaints).    Diet:  Adequate intake.    Activity level: Returning to normal.    Pain:  He reports no pain.          History taken from: Patient and chart    Objective     Vital Signs  Temp:  [97.6 °F (36.4 °C)] 97.6 °F (36.4 °C)  Heart Rate:  [67-82] 77  Resp:  [18] 18  BP: (112-175)/(59-84) 175/84       Wt Readings from Last 1 Encounters:   08/31/18 0118 70.9 kg (156 lb 3.2 oz)   08/30/18 2142 72.1 kg (159 lb)       Objective:  General Appearance:  Comfortable, well-appearing, in no acute distress and not in pain.    Vital signs: (most recent): Blood pressure 154/72, pulse 70, temperature 97.6 °F (36.4 °C), temperature source Oral, resp. rate 18, height 170.2 cm (67\"), weight 70.9 kg (156 lb 3.2 oz), SpO2 94 %.  Vital signs are normal.  No fever.    Output: Producing urine.    HEENT: Normal HEENT exam.    Lungs:  Normal effort and normal respiratory rate.  Breath sounds clear to auscultation.  He is not in respiratory distress.  No decreased breath sounds.    Heart: Normal rate.  Regular rhythm.    Abdomen: Abdomen is soft.  Bowel sounds are normal.   There is no abdominal tenderness.     Extremities: Normal range of motion.    Pulses: Distal pulses are intact.    Neurological: Patient is alert and oriented to person, place and time.    Skin:  Warm and dry.  No rash or cyanosis. "             Results Review:    Intake/Output:     Intake/Output Summary (Last 24 hours) at 09/01/18 1354  Last data filed at 09/01/18 0909   Gross per 24 hour   Intake              640 ml   Output             2275 ml   Net            -1635 ml         DATA:  Radiology and Labs:  The following labs independently reviewed by me. Additional labs ordered for tomorrow a.m.  Interval notes, chart personally reviewed by me.   Old records independently reviewed showing previous admission with severe hyponatremia and SIADH  The following radiologic studies independently viewed by me, findings chest x-ray showing chronic left lung findings  New problems include her sitting hypertension, dehydration  Discussed with patient and family members at bedside        Labs:   Recent Results (from the past 24 hour(s))   Sodium    Collection Time: 08/31/18  4:04 PM   Result Value Ref Range    Sodium 122 (L) 136 - 145 mmol/L   Sodium    Collection Time: 09/01/18 12:30 AM   Result Value Ref Range    Sodium 125 (L) 136 - 145 mmol/L   Renal Function Panel    Collection Time: 09/01/18  7:46 AM   Result Value Ref Range    Glucose 91 65 - 99 mg/dL    BUN 8 8 - 23 mg/dL    Creatinine 0.51 (L) 0.76 - 1.27 mg/dL    Sodium 125 (L) 136 - 145 mmol/L    Potassium 4.1 3.5 - 5.2 mmol/L    Chloride 92 (L) 98 - 107 mmol/L    CO2 23.6 22.0 - 29.0 mmol/L    Calcium 8.6 8.6 - 10.5 mg/dL    Albumin 3.80 3.50 - 5.20 g/dL    Phosphorus 2.5 2.5 - 4.5 mg/dL    Anion Gap 9.4 mmol/L    BUN/Creatinine Ratio 15.7 7.0 - 25.0    eGFR Non African Amer >150 >60 mL/min/1.73   CBC Auto Differential    Collection Time: 09/01/18  7:46 AM   Result Value Ref Range    WBC 7.19 4.50 - 10.70 10*3/mm3    RBC 3.98 (L) 4.60 - 6.00 10*6/mm3    Hemoglobin 11.2 (L) 13.7 - 17.6 g/dL    Hematocrit 34.2 (L) 40.4 - 52.2 %    MCV 85.9 79.8 - 96.2 fL    MCH 28.1 27.0 - 32.7 pg    MCHC 32.7 32.6 - 36.4 g/dL    RDW 12.8 11.5 - 14.5 %    RDW-SD 40.4 37.0 - 54.0 fl    MPV 8.8 6.0 - 12.0 fL     Platelets 244 140 - 500 10*3/mm3    Neutrophil % 72.7 42.7 - 76.0 %    Lymphocyte % 13.5 (L) 19.6 - 45.3 %    Monocyte % 11.8 5.0 - 12.0 %    Eosinophil % 1.7 0.3 - 6.2 %    Basophil % 0.3 0.0 - 1.5 %    Immature Grans % 0.0 0.0 - 0.5 %    Neutrophils, Absolute 5.23 1.90 - 8.10 10*3/mm3    Lymphocytes, Absolute 0.97 0.90 - 4.80 10*3/mm3    Monocytes, Absolute 0.85 0.20 - 1.20 10*3/mm3    Eosinophils, Absolute 0.12 0.00 - 0.70 10*3/mm3    Basophils, Absolute 0.02 0.00 - 0.20 10*3/mm3    Immature Grans, Absolute 0.00 0.00 - 0.03 10*3/mm3       Radiology:  Imaging Results (last 24 hours)     ** No results found for the last 24 hours. **             Medications have been reviewed:  Current Facility-Administered Medications   Medication Dose Route Frequency Provider Last Rate Last Dose   • acetaminophen (TYLENOL) tablet 650 mg  650 mg Oral Q4H PRN Junior Bacon MD   650 mg at 09/01/18 0906   • albuterol (PROVENTIL) nebulizer solution 0.083% 2.5 mg/3mL  2.5 mg Nebulization Q4H PRN Miguel Angel Honeycutt MD       • amLODIPine (NORVASC) tablet 5 mg  5 mg Oral Q24H Junior Bacon MD   5 mg at 09/01/18 0906   • apixaban (ELIQUIS) tablet 5 mg  5 mg Oral Q12H Junior Bacon MD   5 mg at 09/01/18 0907   • aspirin EC tablet 81 mg  81 mg Oral Daily Junior Bacon MD   81 mg at 09/01/18 0906   • budesonide-formoterol (SYMBICORT) 80-4.5 MCG/ACT inhaler 2 puff  2 puff Inhalation BID - RT Miguel Angel Honeycutt MD       • clopidogrel (PLAVIX) tablet 75 mg  75 mg Oral Every Other Day Junior Bacon MD   75 mg at 08/31/18 1035   • demeclocycline (DECLOMYCIN) tablet 300 mg  300 mg Oral Q12H Shane López MD       • metoprolol succinate XL (TOPROL-XL) 24 hr tablet 25 mg  25 mg Oral Daily Junior Bacon MD   25 mg at 09/01/18 0906   • ondansetron (ZOFRAN) tablet 4 mg  4 mg Oral Q6H PRN Junior Bacon MD        Or   • ondansetron ODT (ZOFRAN-ODT) disintegrating tablet 4 mg  4 mg Oral Q6H PRN Junior Bacon MD        Or   • ondansetron  (ZOFRAN) injection 4 mg  4 mg Intravenous Q6H PRN Junior Bacon MD       • oxyCODONE-acetaminophen (PERCOCET) 5-325 MG per tablet 1 tablet  1 tablet Oral Q6H PRN Junior Bacon MD   1 tablet at 08/31/18 1657   • pantoprazole (PROTONIX) EC tablet 40 mg  40 mg Oral QAM Junior Bacon MD   40 mg at 09/01/18 0906   • rosuvastatin (CRESTOR) tablet 5 mg  5 mg Oral Daily Junior Bacon MD   5 mg at 09/01/18 0906   • sodium chloride 0.9 % flush 1-10 mL  1-10 mL Intravenous PRN Junior Bacon MD       • sodium chloride 0.9 % flush 10 mL  10 mL Intravenous PRN Bhargav Lee MD           ASSESSMENT:  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  Uric acid low consistent with SIADH, urine studies were also consistent with SIADH last hospital admission.    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 dehydration        PLAN:   His sodium is improved from 122-> 125, likely due to correction of his underlying volume depletion at the time of admission  Since this appears to be more of a chronic issue consistent with SIADH from his underlying lung disease, demeclocycline may be a better choice than [place] since it can only be given for 30 days  Will stop IV fluids today  Continue fluid restriction  Start demeclocycline 300 mg oral twice a day   From my standpoint if his sodium remains stable he can be discharged tomorrow on oral demeclocycline with follow-up in my office in one to 2 weeks     Continue to monitor electrolytes and volume closely  Please call with any questions or concerns.     Shane López MD   [facility]   Office phone number: [phone]  Answering service phone number:  [phone]    09/01/18  1:54 PM      Dictation performed using Dragon dictation software

## 2018-09-02 VITALS
RESPIRATION RATE: 14 BRPM | SYSTOLIC BLOOD PRESSURE: 162 MMHG | OXYGEN SATURATION: 95 % | TEMPERATURE: 98 F | BODY MASS INDEX: 24.52 KG/M2 | HEART RATE: 75 BPM | DIASTOLIC BLOOD PRESSURE: 78 MMHG | HEIGHT: 67 IN | WEIGHT: 156.2 LBS

## 2018-09-02 LAB
ANION GAP SERPL CALCULATED.3IONS-SCNC: 11.5 MMOL/L
BACTERIA SPEC AEROBE CULT: NORMAL
BASOPHILS # BLD AUTO: 0.01 10*3/MM3 (ref 0–0.2)
BASOPHILS NFR BLD AUTO: 0.2 % (ref 0–1.5)
BUN BLD-MCNC: 6 MG/DL (ref 8–23)
BUN/CREAT SERPL: 9.8 (ref 7–25)
CALCIUM SPEC-SCNC: 8.5 MG/DL (ref 8.6–10.5)
CHLORIDE SERPL-SCNC: 97 MMOL/L (ref 98–107)
CO2 SERPL-SCNC: 24.5 MMOL/L (ref 22–29)
CREAT BLD-MCNC: 0.61 MG/DL (ref 0.76–1.27)
DEPRECATED RDW RBC AUTO: 40.9 FL (ref 37–54)
EOSINOPHIL # BLD AUTO: 0.16 10*3/MM3 (ref 0–0.7)
EOSINOPHIL NFR BLD AUTO: 2.6 % (ref 0.3–6.2)
ERYTHROCYTE [DISTWIDTH] IN BLOOD BY AUTOMATED COUNT: 13.3 % (ref 11.5–14.5)
GFR SERPL CREATININE-BSD FRML MDRD: 127 ML/MIN/1.73
GLUCOSE BLD-MCNC: 88 MG/DL (ref 65–99)
HCT VFR BLD AUTO: 31 % (ref 40.4–52.2)
HGB BLD-MCNC: 10.6 G/DL (ref 13.7–17.6)
IMM GRANULOCYTES # BLD: 0.01 10*3/MM3 (ref 0–0.03)
IMM GRANULOCYTES NFR BLD: 0.2 % (ref 0–0.5)
LYMPHOCYTES # BLD AUTO: 1.01 10*3/MM3 (ref 0.9–4.8)
LYMPHOCYTES NFR BLD AUTO: 16.6 % (ref 19.6–45.3)
MCH RBC QN AUTO: 29 PG (ref 27–32.7)
MCHC RBC AUTO-ENTMCNC: 34.2 G/DL (ref 32.6–36.4)
MCV RBC AUTO: 84.9 FL (ref 79.8–96.2)
MONOCYTES # BLD AUTO: 0.86 10*3/MM3 (ref 0.2–1.2)
MONOCYTES NFR BLD AUTO: 14.1 % (ref 5–12)
NEUTROPHILS # BLD AUTO: 4.06 10*3/MM3 (ref 1.9–8.1)
NEUTROPHILS NFR BLD AUTO: 66.5 % (ref 42.7–76)
PLATELET # BLD AUTO: 285 10*3/MM3 (ref 140–500)
PMV BLD AUTO: 8.5 FL (ref 6–12)
POTASSIUM BLD-SCNC: 4 MMOL/L (ref 3.5–5.2)
RBC # BLD AUTO: 3.65 10*6/MM3 (ref 4.6–6)
SODIUM BLD-SCNC: 133 MMOL/L (ref 136–145)
WBC NRBC COR # BLD: 6.1 10*3/MM3 (ref 4.5–10.7)

## 2018-09-02 PROCEDURE — 80048 BASIC METABOLIC PNL TOTAL CA: CPT | Performed by: HOSPITALIST

## 2018-09-02 PROCEDURE — 97110 THERAPEUTIC EXERCISES: CPT

## 2018-09-02 PROCEDURE — 85025 COMPLETE CBC W/AUTO DIFF WBC: CPT | Performed by: HOSPITALIST

## 2018-09-02 PROCEDURE — 94799 UNLISTED PULMONARY SVC/PX: CPT

## 2018-09-02 RX ORDER — DEMECLOCYCLINE HYDROCHLORIDE 300 MG/1
300 TABLET, FILM COATED ORAL EVERY 12 HOURS SCHEDULED
Qty: 18 TABLET | Refills: 0 | Status: SHIPPED | OUTPATIENT
Start: 2018-09-02 | End: 2018-09-02

## 2018-09-02 RX ORDER — DEMECLOCYCLINE HYDROCHLORIDE 300 MG/1
300 TABLET, FILM COATED ORAL EVERY 12 HOURS SCHEDULED
Qty: 60 TABLET | Refills: 0 | Status: SHIPPED | OUTPATIENT
Start: 2018-09-02 | End: 2018-10-02

## 2018-09-02 RX ADMIN — APIXABAN 5 MG: 5 TABLET, FILM COATED ORAL at 09:43

## 2018-09-02 RX ADMIN — PANTOPRAZOLE SODIUM 40 MG: 40 TABLET, DELAYED RELEASE ORAL at 09:43

## 2018-09-02 RX ADMIN — AMLODIPINE BESYLATE 5 MG: 5 TABLET ORAL at 09:43

## 2018-09-02 RX ADMIN — METOPROLOL SUCCINATE 25 MG: 25 TABLET, FILM COATED, EXTENDED RELEASE ORAL at 09:43

## 2018-09-02 RX ADMIN — ASPIRIN 81 MG: 81 TABLET, DELAYED RELEASE ORAL at 09:43

## 2018-09-02 RX ADMIN — DEMECLOCYCLINE HYDROCHLORIDE 300 MG: 150 TABLET ORAL at 09:43

## 2018-09-02 RX ADMIN — CLOPIDOGREL 75 MG: 75 TABLET, FILM COATED ORAL at 09:43

## 2018-09-02 RX ADMIN — ROSUVASTATIN CALCIUM 5 MG: 5 TABLET, FILM COATED ORAL at 09:43

## 2018-09-02 RX ADMIN — BUDESONIDE AND FORMOTEROL FUMARATE DIHYDRATE 2 PUFF: 80; 4.5 AEROSOL RESPIRATORY (INHALATION) at 07:51

## 2018-09-02 NOTE — PLAN OF CARE
Problem: Patient Care Overview  Goal: Plan of Care Review  Outcome: Ongoing (interventions implemented as appropriate)   09/02/18 0359   Coping/Psychosocial   Plan of Care Reviewed With patient   Plan of Care Review   Progress improving   OTHER   Outcome Summary No acute events overnight. No episodes of confusion. Heating pad working well for back pain. VSS. Possible d/c today if am sodium is stable       Problem: Fall Risk (Adult)  Goal: Absence of Fall  Outcome: Ongoing (interventions implemented as appropriate)      Problem: Fluid Volume Deficit (Adult)  Goal: Optimal Fluid Balance  Outcome: Ongoing (interventions implemented as appropriate)

## 2018-09-02 NOTE — PLAN OF CARE
Problem: Patient Care Overview  Goal: Plan of Care Review  Outcome: Ongoing (interventions implemented as appropriate)   09/02/18 1049   Coping/Psychosocial   Plan of Care Reviewed With patient   Plan of Care Review   Progress improving   OTHER   Outcome Summary Pt Na level increased to 133 this AM; possible DC today; VSS; Pt up ad twan and no c/o pain or N/V; Pt worked with PT this AM.     Goal: Individualization and Mutuality  Outcome: Ongoing (interventions implemented as appropriate)    Goal: Discharge Needs Assessment  Outcome: Ongoing (interventions implemented as appropriate)    Goal: Interprofessional Rounds/Family Conf  Outcome: Ongoing (interventions implemented as appropriate)      Problem: Fall Risk (Adult)  Goal: Absence of Fall  Outcome: Ongoing (interventions implemented as appropriate)      Problem: Fluid Volume Deficit (Adult)  Goal: Optimal Fluid Balance  Outcome: Ongoing (interventions implemented as appropriate)

## 2018-09-02 NOTE — THERAPY TREATMENT NOTE
Acute Care - Physical Therapy Treatment Note  [facility]     Patient Name: Adriel Mackenzie  : 1935  MRN: [id number]  Today's Date: 2018  Onset of Illness/Injury or Date of Surgery: 18  Date of Referral to PT: 18  Referring Physician: Junior Bacon MD    Admit Date: 2018    Visit Dx:    ICD-10-CM ICD-9-CM   1. Dehydration E86.0 276.51   2. Acute hyponatremia E87.1 276.1   3. Difficulty walking R26.2 719.7     Patient Active Problem List   Diagnosis   • GERD (gastroesophageal reflux disease)   • Hyperlipidemia   • Pleural effusion   • History of coronary artery stent placement   • Essential hypertension   • Pneumonia of left lower lobe due to infectious organism (CMS/HCC)   • Hyponatremia   • Pleural thickening   • SIADH (syndrome of inappropriate ADH production) (CMS/HCC)   • Acute deep vein thrombosis (DVT) of distal vein of both lower extremities (CMS/HCC)   • Dehydration   • Ketonuria       Therapy Treatment          Rehabilitation Treatment Summary     Row Name 18             Treatment Time/Intention    Discipline physical therapist  -SP      Document Type therapy note (daily note)  -SP      Subjective Information no complaints  -SP      Mode of Treatment physical therapy  -SP      Patient Effort good  -SP      Existing Precautions/Restrictions fall  -SP      Recorded by [SP] Ling South, PT 18      Row Name 18             Cognitive Assessment/Intervention- PT/OT    Orientation Status (Cognition) oriented x 4  -SP      Follows Commands (Cognition) WNL  -SP      Recorded by [SP] Ling South, PT 18      Row Name 18             Bed Mobility Assessment/Treatment    Bed Mobility Assessment/Treatment bed mobility (all) activities  -SP      [place] Level (Bed Mobility) independent  -SP      Recorded by [SP] Ling South, PT 18      Row Name 18             Transfer Assessment/Treatment     Transfer Assessment/Treatment sit-stand transfer;stand-sit transfer  -SP      Recorded by [SP] Ling South, PT 09/02/18 0943      Row Name 09/02/18 0900             Sit-Stand Transfer    Sit-Stand [place] (Transfers) independent  -SP      Recorded by [SP] Ling South, PT 09/02/18 0943      Row Name 09/02/18 0900             Stand-Sit Transfer    Stand-Sit [place] (Transfers) independent  -SP      Recorded by [SP] Ling South, PT 09/02/18 0943      Row Name 09/02/18 0900             Gait/Stairs Assessment/Training    [place] Level (Gait) independent  -SP      Distance in Feet (Gait) 300  -SP2      Pattern (Gait) swing-through  -SP2      Deviations/Abnormal Patterns (Gait) base of support, wide;mookie decreased;bilateral deviations  -SP2      Bilateral Gait Deviations heel strike decreased;lateral trunk flexion  -SP2      Comment (Gait/Stairs) no symptoms with gait   -SP2      Recorded by [SP] Ling South, PT 09/02/18 0945  [SP2] Ling South, PT 09/02/18 0943      Row Name 09/02/18 0900             Balance Interventions    Training Strategies (Balance) heel cord stretch B 10 sec x 3, balance ex x 10: heel raise, toe raise, sls 5 sec B x 5 and side step x 5 b.   -SP      Recorded by [SP] Ling South, PT 09/02/18 0948      Row Name 09/02/18 0900             Positioning and Restraints    Pre-Treatment Position in bed  -SP      Post Treatment Position bed  -SP      In Bed sitting EOB;with family/caregiver  -SP      Recorded by [SP] Ling South, PT 09/02/18 0943      Row Name 09/02/18 0900             Pain Scale: Numbers Pre/Post-Treatment    Pain Scale: Numbers, Pretreatment 0/10 - no pain  -SP      Recorded by [MIGNON] Ling South, PT 09/02/18 0943        User Key  (r) = Recorded By, (t) = Taken By, (c) = Cosigned By    Initials Name Effective Dates Discipline    Ling Marquez, PT 04/03/18 -  PT                     Physical Therapy Education     Title: PT OT SLP  Therapies (Done)     Topic: Physical Therapy (Done)     Point: Mobility training (Done)    Learning Progress Summary     Learner Status Readiness Method Response Comment Documented by    Patient Done Acceptance E,TB VU practiced balance ex. SP 09/02/18 0945     Active Acceptance E,D NR   08/31/18 1053          Point: Home exercise program (Done)    Learning Progress Summary     Learner Status Readiness Method Response Comment Documented by    Patient Done Acceptance E,TB VU practiced balance ex. SP 09/02/18 0945     Done Acceptance E,TB VU,NR,DU started balance ex/practiced SP 09/01/18 1610                      User Key     Initials Effective Dates Name Provider Type Discipline     04/03/18 -  Ling South, PT Physical Therapist PT     08/17/18 -  Flex Beckford, REBECCA Student PT Student PT                    PT Recommendation and Plan     Plan of Care Reviewed With: patient  Progress: improving  Outcome Summary: Pt is indep. bed mob/transfer and gait x 300 ft. DC PT            Outcome Measures     Row Name 09/02/18 0900 09/01/18 1600 08/31/18 1100       How much help from another person do you currently need...    Turning from your back to your side while in flat bed without using bedrails? 4  -SP 4  -SP 4  -EM (r) DB (t) EM (c)    Moving from lying on back to sitting on the side of a flat bed without bedrails? 4  -SP 4  -SP 4  -EM (r) DB (t) EM (c)    Moving to and from a bed to a chair (including a wheelchair)? 4  -SP 4  -SP 3  -EM (r) DB (t) EM (c)    Standing up from a chair using your arms (e.g., wheelchair, bedside chair)? 4  -SP 4  -SP 3  -EM (r) DB (t) EM (c)    Climbing 3-5 steps with a railing? 3  -SP 3  -SP 3  -EM (r) DB (t) EM (c)    To walk in hospital room? 4  -SP 4  -SP 3  -EM (r) DB (t) EM (c)    AM-PAC 6 Clicks Score 23  -SP 23  -SP 20  -EM (r) DB (t)       Functional Assessment    Outcome Measure Options  -- AM-PAC 6 Clicks Basic Mobility (PT)  -SP AM-PAC 6 Clicks Basic Mobility (PT)  -EM  (r) DB (t) EM (c)      User Key  (r) = Recorded By, (t) = Taken By, (c) = Cosigned By    Initials Name Provider Type    EM Nivia Vicente, PT Physical Therapist    SP Ling South, PT Physical Therapist    DB Flex Beckford, PT Student PT Student           Time Calculation:         PT Charges     Row Name 09/02/18 0948             Time Calculation    Start Time 0925  -SP      Stop Time 0940  -SP      Time Calculation (min) 15 min  -SP      PT Received On 09/02/18  -SP        User Key  (r) = Recorded By, (t) = Taken By, (c) = Cosigned By    Initials Name Provider Type    Ling Marquez, PT Physical Therapist        Therapy Suggested Charges     Code   Minutes Charges    None           Therapy Charges for Today     Code Description Service Date Service Provider Modifiers Qty    58712972001 HC PT THER PROC EA 15 MIN 9/1/2018 Ling South, PT GP 1    11394189958 HC PT THER PROC EA 15 MIN 9/2/2018 Ling South, PT GP 1          PT G-Codes  Outcome Measure Options: AM-PAC 6 Clicks Basic Mobility (PT)    Ling South, PT  9/2/2018

## 2018-09-02 NOTE — DISCHARGE SUMMARY
Date of Admission: 8/30/2018  Date of Discharge:  9/2/2018    PCP: Michelle Bowser APRN      DISCHARGE DIAGNOSIS  Principal Problem:    Dehydration  Active Problems:    GERD (gastroesophageal reflux disease)    Hyperlipidemia    History of coronary artery stent placement    Essential hypertension    Hyponatremia    SIADH (syndrome of inappropriate ADH production) (CMS/[facility])    Acute deep vein thrombosis (DVT) of distal vein of both lower extremities (CMS/[facility])    Ketonuria      SECONDARY DIAGNOSES  Past Medical History:   Diagnosis Date   • BCC (basal cell carcinoma)    • Chronic bronchitis (CMS/[facility])    • Coronary artery disease    • GERD (gastroesophageal reflux disease)    • H/O complete eye exam 2016    scheduled   • History of coronary artery stent placement    • Hyperlipidemia    • Pleural effusion    • PVD (peripheral vascular disease) (CMS/[facility])        CONSULTS   Consults     Date and Time Order Name Status Description    8/31/2018 0045 Inpatient Nephrology Consult Completed     8/30/2018 2238 [facility] (on-call MD unless specified) Completed     8/18/2018 1433 Inpatient Nephrology Consult Completed     8/18/2018 1429 Inpatient Thoracic Surgery Consult Completed     8/18/2018 1259 [facility] (on-call MD unless specified) Completed           PROCEDURES PERFORMED  XR Chest 2 View   Final Result   Stable, extensive chronic changes on the left side without   significant change from previous       This report was finalized on 8/30/2018 10:58 PM by Avni Jason M.D.                HOSPITAL COURSE  Patient is a 83 y.o. male presented to [facility] complaining of weakness fatigue and back pain.  Please see the admitting history and physical for further details.  He was admitted to the hospital is symptomatically hyponatremia.  He recently been in the hospital for similar events.  Please see admission and discharge from that hospitalization.  He was discharged with possible SIADH.  He responded well  "to tolvaptan on that admission.  Following discharge home he slowly deteriorated over the following few days.  He actually come the emergency room 3 days in a row for persistent symptoms and was finally admitted to our service.  His sodium on admission was 120.  He was hydrated and placed on a fluid restriction and his sodium quickly improved.  He was also started on demeclocycline for the hyponatremia.  He only received 1 dose of this here in the hospital.  Given the rebound and improvement in his sodium the patient stable for discharge home today.  As far as his back pains concerned this was likely a soft tissue injury or strain and did improve with supportive care.  Recommend continued massage and activity at home.  Following discharge from the hospital we were unable to find a pharmacy to carry the demeclocycline.  I discussed this with Dr. López his nephrologist and recommendations were to hold this medication and recheck the sodium later this week.  Results are to be faxed to his office.  Otherwise at the time of discharge she is stable the plan was discussed with both him and his daughter at the bedside and all are in agreement with discharge today.      CONDITION ON DISCHARGE  Stable.      VITAL SIGNS  /78 (BP Location: Left arm, Patient Position: Lying)   Pulse 75   Temp 98 °F (36.7 °C) (Oral)   Resp 14   Ht 170.2 cm (67\")   Wt 70.9 kg (156 lb 3.2 oz)   SpO2 95%   BMI 24.46 kg/m²   Objective:  General Appearance:  Comfortable.    Vital signs: (most recent): Blood pressure 162/78, pulse 75, temperature 98 °F (36.7 °C), temperature source Oral, resp. rate 14, height 170.2 cm (67\"), weight 70.9 kg (156 lb 3.2 oz), SpO2 95 %.  Vital signs are normal.  No fever.    Output: Producing urine and producing stool.    HEENT: Normal HEENT exam.    Lungs:  Normal effort.  Breath sounds clear to auscultation.    Heart: Normal rate.  S1 normal.    Abdomen: Abdomen is soft.  Bowel sounds are normal.   There " is no abdominal tenderness.     Extremities: Normal range of motion.    Pulses: Distal pulses are intact.    Neurological: Patient is alert and oriented to person, place and time.                DISCHARGE DISPOSITION   Home or Self Care      DISCHARGE MEDICATIONS     Discharge Medications      New Medications      Instructions Start Date   demeclocycline 300 MG tablet  Commonly known as:  DECLOMYCIN   300 mg, Oral, Every 12 Hours Scheduled         Continue These Medications      Instructions Start Date   acetaminophen 325 MG tablet  Commonly known as:  TYLENOL   650 mg, Oral, Every 4 Hours PRN      albuterol 108 (90 Base) MCG/ACT inhaler  Commonly known as:  PROVENTIL HFA;VENTOLIN HFA   2 puffs, Inhalation, Every 4 Hours PRN      amLODIPine 5 MG tablet  Commonly known as:  NORVASC   5 mg, Oral      apixaban 5 MG tablet tablet  Commonly known as:  ELIQUIS   5 mg, Oral, Every 12 Hours Scheduled      aspirin 81 MG tablet   81 mg, Oral, Nightly      budesonide-formoterol 80-4.5 MCG/ACT inhaler  Commonly known as:  SYMBICORT   2 puffs, Inhalation, 2 Times Daily - RT      clopidogrel 75 MG tablet  Commonly known as:  PLAVIX   75 mg, Oral, Daily      esomeprazole 40 MG capsule  Commonly known as:  nexIUM   1 capsule, Oral, Every Morning      metoprolol succinate XL 25 MG 24 hr tablet  Commonly known as:  TOPROL-XL   25 mg, Oral, Daily      oxyCODONE-acetaminophen 5-325 MG per tablet  Commonly known as:  PERCOCET   1 tablet, Oral, Every 6 Hours PRN      rosuvastatin 5 MG tablet  Commonly known as:  CRESTOR   No dose, route, or frequency recorded.         Stop These Medications    doxycycline 100 MG capsule  Commonly known as:  VIBRAMYCIN          Diet Instructions     Diet: Regular       Discharge Diet:  Regular    Fluid Restriction per day:  Other (See comments)    1200cc fluid restrictions       Activity Instructions     Activity as Tolerated         Future Appointments  Date Time Provider Department Center   9/6/2018  1:30 PM Michelle Bowser APRN [id number] None   9/25/2018 12:30 PM [facility]   10/2/2018 10:30 AM Pedro Devries III, MD [facility] None     Additional Instructions for the Follow-ups that You Need to Schedule     Discharge Follow-up with PCP    As directed      Currently Documented PCP:  Michelle Bowser APRN  PCP Phone Number:  [phone]    Follow Up Details:  4-6 weeks         Discharge Follow-up with Specified Provider: Dr López; 2 Weeks    As directed      To:  Dr López    Follow Up:  2 Weeks    Follow Up Details:  with labs           Follow-up Information     Michelle Bowser APRN .    Specialty:  Family Medicine  Why:  4-6 weeks  Contact information:  [id number] [address] [address] [phone]                   TEST  RESULTS PENDING AT DISCHARGE         Miguel Angel Honeycutt MD  [facility]  09/02/18  10:36 AM      Time: greater than 30 minutes.

## 2018-09-02 NOTE — PLAN OF CARE
Problem: Patient Care Overview  Goal: Plan of Care Review   09/02/18 0943   Coping/Psychosocial   Plan of Care Reviewed With patient   Plan of Care Review   Progress improving   OTHER   Outcome Summary Pt is indep. bed mob/transfer and gait x 300 ft. DC PT

## 2018-09-03 ENCOUNTER — READMISSION MANAGEMENT (OUTPATIENT)
Dept: CALL CENTER | Facility: HOSPITAL | Age: 83
End: 2018-09-03

## 2018-09-03 LAB — BACTERIA SPEC AEROBE CULT: NORMAL

## 2018-09-03 NOTE — OUTREACH NOTE
Prep Survey      Responses   Facility patient discharged from?  [facility]   Is patient eligible?  Yes   Discharge diagnosis   Dehydration   Does the patient have one of the following disease processes/diagnoses(primary or secondary)?  Other   Does the patient have Home health ordered?  No   Is there a DME ordered?  No   Prep survey completed?  Yes          Park Goel RN

## 2018-09-03 NOTE — PROGRESS NOTES
Case Management Discharge Note    Final Note: Patient was DC'd home 9/2         Other: Other (Private car)    Final Discharge Disposition Code: 01 - home or self-care

## 2018-09-06 ENCOUNTER — OFFICE VISIT (OUTPATIENT)
Dept: FAMILY MEDICINE CLINIC | Facility: CLINIC | Age: 83
End: 2018-09-06

## 2018-09-06 VITALS
HEIGHT: 67 IN | SYSTOLIC BLOOD PRESSURE: 147 MMHG | OXYGEN SATURATION: 97 % | TEMPERATURE: 97.7 F | HEART RATE: 66 BPM | DIASTOLIC BLOOD PRESSURE: 77 MMHG | WEIGHT: 157 LBS | BODY MASS INDEX: 24.64 KG/M2

## 2018-09-06 DIAGNOSIS — E22.2 SIADH (SYNDROME OF INAPPROPRIATE ADH PRODUCTION) (HCC): ICD-10-CM

## 2018-09-06 DIAGNOSIS — E87.1 HYPONATREMIA: Primary | ICD-10-CM

## 2018-09-06 DIAGNOSIS — Z09 HOSPITAL DISCHARGE FOLLOW-UP: ICD-10-CM

## 2018-09-06 DIAGNOSIS — J18.9 PNEUMONIA DUE TO INFECTIOUS ORGANISM, UNSPECIFIED LATERALITY, UNSPECIFIED PART OF LUNG: ICD-10-CM

## 2018-09-06 PROCEDURE — 99214 OFFICE O/P EST MOD 30 MIN: CPT | Performed by: NURSE PRACTITIONER

## 2018-09-06 NOTE — PROGRESS NOTES
Subjective   Adriel Mackenzie is a 83 y.o. male.     History of Present Illness   Adriel Mackenzie 83 y.o. male presents today for hosptial follow up.  he was treated [facility] for pneumonia, SIADH.  I reviewed all of the labs and diagnostic testing.    Current outpatient and discharge medications have been reconciled for the patient.  Reviewed by: LESLIE Bravo    he does have a follow up appointment with a specialist:    Hospital note as follows:    Hospital Course  Please see history and physical for details. Patient is a 83 y.o. male initially admitted for chest pain.  Patient was found to have pneumonia and treated with IV Rocephin and azithromycin.  Formally been on Levaquin.  At discharge I will transition him over to doxycycline.  He was also found to have hyponatremia and nephrology helped manage this.  By discharge hyponatremia is nearly resolved with a level of 133 and no aspects of altered mentation nausea or vomiting.  He was found to have pleural thickening with mediastinal lymphadenopathy and thoracic surgery was consulted.  I agree with the plan to further treat with antibiotics and will see how repeat imaging with CT in the next 4-6 weeks shows any changes.  Depending on those results thoracic surgery will consider biopsy.  Patient was found to have a CV thrombosis and is already on aspirin and Plavix due to PAD and coronary artery disease with stent history.  His vascular surgeon is Dr. Olguin and I've advised that he follow-up with Dr. Olguin within 4 weeks.  I was able to get him a 30 day voucher Eliquis and perhaps if his repeat Doppler is showing resolution of calf vein thrombosis patient can discontinue Eliquis at that juncture and remain on aspirin and Plavix only but ultimately I'll defer this final recommendation to Dr. Olguin.  I counseled in regards to increased risk of bleeding and warning signs.  At this juncture and all questions have been answered to patient and  daughter at bedside who is very involved in the care of her father.  His vital signs are stable as well as afebrile he feels clinically much better and is very much amenable to going home at this juncture.  HOSPITAL COURSE  Patient is a 83 y.o. male presented to [facility] complaining of weakness fatigue and back pain.  Please see the admitting history and physical for further details.  He was admitted to the hospital is symptomatically hyponatremia.  He recently been in the hospital for similar events.  Please see admission and discharge from that hospitalization.  He was discharged with possible SIADH.  He responded well to tolvaptan on that admission.  Following discharge home he slowly deteriorated over the following few days.  He actually come the emergency room 3 days in a row for persistent symptoms and was finally admitted to our service.  His sodium on admission was 120.  He was hydrated and placed on a fluid restriction and his sodium quickly improved.  He was also started on demeclocycline for the hyponatremia.  He only received 1 dose of this here in the hospital.  Given the rebound and improvement in his sodium the patient stable for discharge home today.  As far as his back pains concerned this was likely a soft tissue injury or strain and did improve with supportive care.  Recommend continued massage and activity at home.  Following discharge from the hospital we were unable to find a pharmacy to carry the demeclocycline.  I discussed this with Dr. López his nephrologist and recommendations were to hold this medication and recheck the sodium later this week.  Results are to be faxed to his office.  Otherwise at the time of discharge she is stable the plan was discussed with both him and his daughter at the bedside and all are in agreement with discharge today.       Patient reports feeling improved.  He is currently staying with his daughter and is anxious to be back at his apartment  and be independent.  He reports very little cough and denies chest pain. Still has some fatigue.  He needs his sodium rechecked.  He will contact Dr. López's office for appt as he has not scheduled yet.  He has stopped the Daypro.   He is also taking Eliquis for DVT in addition to his plavix.  He reports constipation.  He reports that he had some rectal bleeding with BM on Monday night and very slight amount on Tuesday Morning, but none since.  He has stopped taking the percocet and reports relatively normal BM today. He is following fluid restriction as instructed.       The following portions of the patient's history were reviewed and updated as appropriate: allergies, current medications, past family history, past medical history, past social history, past surgical history and problem list.    Review of Systems   Constitutional: Positive for fatigue.   Respiratory: Positive for cough. Negative for shortness of breath.    Cardiovascular: Negative for chest pain and palpitations.   Endocrine: Positive for polyuria.   Musculoskeletal: Negative for back pain and gait problem.   Skin: Negative for rash.   Neurological: Negative for dizziness, numbness and headaches.   Psychiatric/Behavioral: Negative for confusion, dysphoric mood and sleep disturbance. The patient is not nervous/anxious.        Objective   Physical Exam   Constitutional: He is oriented to person, place, and time. He appears well-developed and well-nourished.   Cardiovascular: Normal rate and regular rhythm.    Pulmonary/Chest: Effort normal. He has decreased breath sounds in the right lower field and the left lower field.   Neurological: He is oriented to person, place, and time.   Skin: Skin is warm and dry.   Psychiatric: He has a normal mood and affect. His behavior is normal. Judgment and thought content normal.   Nursing note and vitals reviewed.      Assessment/Plan   Adriel was seen today for illness.    Diagnoses and all orders for this  visit:    Hyponatremia  -     Basic metabolic panel    Hospital discharge follow-up    Pneumonia due to infectious organism, unspecified laterality, unspecified part of lung    SIADH (syndrome of inappropriate ADH production) (CMS/HCC)        Folow up with pulmonology, nephrology and vascular surgery as instructed.      Recheck nonfasting BMP today and forward results to Dr. López.      Patient is to f/u if anymore episodes of rectal bleeding.

## 2018-09-07 ENCOUNTER — READMISSION MANAGEMENT (OUTPATIENT)
Dept: CALL CENTER | Facility: HOSPITAL | Age: 83
End: 2018-09-07

## 2018-09-07 LAB
BUN SERPL-MCNC: 17 MG/DL (ref 8–23)
BUN/CREAT SERPL: 21 (ref 7–25)
CALCIUM SERPL-MCNC: 9 MG/DL (ref 8.6–10.5)
CHLORIDE SERPL-SCNC: 99 MMOL/L (ref 98–107)
CO2 SERPL-SCNC: 25.5 MMOL/L (ref 22–29)
CREAT SERPL-MCNC: 0.81 MG/DL (ref 0.76–1.27)
GLUCOSE SERPL-MCNC: 106 MG/DL (ref 65–99)
POTASSIUM SERPL-SCNC: 4.1 MMOL/L (ref 3.5–5.2)
SODIUM SERPL-SCNC: 137 MMOL/L (ref 136–145)

## 2018-09-07 NOTE — OUTREACH NOTE
Medical Week 1 Survey      Responses   Facility patient discharged from?  [facility]   Does the patient have one of the following disease processes/diagnoses(primary or secondary)?  Other   Is there a successful TCM telephone encounter documented?  No   Week 1 attempt successful?  Yes   Call start time  1334   Call end time  1338   Discharge diagnosis   Dehydration   Is patient permission given to speak with other caregiver?  Yes   List who call center can speak with  daughter, Shirlene   Person spoke with today (if not patient) and relationship  Adriel, luis   Meds reviewed with patient/caregiver?  Yes   Is the patient having any side effects they believe may be caused by any medication additions or changes?  No   Does the patient have all medications ordered at discharge?  Yes   Is the patient taking all medications as directed (includes completed medication regime)?  Yes   Comments regarding appointments  has seen Dr. Araiza 9/6   Does the patient have a primary care provider?   Yes   Does the patient have an appointment with their PCP within 7 days of discharge?  Yes   Comments regarding PCP  has already seen PCP   Has the patient kept scheduled appointments due by today?  Yes   Has home health visited the patient within 72 hours of discharge?  N/A   Psychosocial issues?  No   Did the patient receive a copy of their discharge instructions?  Yes   Nursing interventions  Reviewed instructions with patient, Educated on MyChart   What is the patient's perception of their health status since discharge?  Improving   Is the patient/caregiver able to teach back signs and symptoms related to disease process for when to call PCP?  Yes   Is the patient/caregiver able to teach back signs and symptoms related to disease process for when to call 911?  Yes   Is the patient/caregiver able to teach back the hierarchy of who to call/visit for symptoms/problems? PCP, Specialist, Home health nurse, Urgent Care, ED, 911  Yes    Week 1 call completed?  Yes          Madyson Tristan RN

## 2018-09-15 ENCOUNTER — READMISSION MANAGEMENT (OUTPATIENT)
Dept: CALL CENTER | Facility: HOSPITAL | Age: 83
End: 2018-09-15

## 2018-09-15 NOTE — OUTREACH NOTE
Medical Week 2 Survey      Responses   Facility patient discharged from?  [facility]   Does the patient have one of the following disease processes/diagnoses(primary or secondary)?  Other   Week 2 attempt successful?  No   Unsuccessful attempts  Attempt 1          Mariel Olguin RN

## 2018-09-18 ENCOUNTER — READMISSION MANAGEMENT (OUTPATIENT)
Dept: CALL CENTER | Facility: HOSPITAL | Age: 83
End: 2018-09-18

## 2018-09-18 NOTE — OUTREACH NOTE
Medical Week 2 Survey      Responses   Facility patient discharged from?  [place]   Does the patient have one of the following disease processes/diagnoses(primary or secondary)?  Other   Week 2 attempt successful?  Yes   Call start time  1451   Discharge diagnosis   Dehydration   Call end time  1456   Meds reviewed with patient/caregiver?  Yes   Is the patient having any side effects they believe may be caused by any medication additions or changes?  No   Does the patient have all medications ordered at discharge?  Yes   Prescription comments  Stopped Eliquis, by the vein MD   Is the patient taking all medications as directed (includes completed medication regime)?  Yes   Comments regarding appointments   Dr. Araiza 9/6   Does the patient have a primary care provider?   Yes   Does the patient have an appointment with their PCP within 7 days of discharge?  N/A   Comments regarding PCP  Appt with PCP since D/C   Has the patient kept scheduled appointments due by today?  Yes   Has home health visited the patient within 72 hours of discharge?  N/A   Psychosocial issues?  No   Did the patient receive a copy of their discharge instructions?  Yes   Nursing interventions  Reviewed instructions with patient   What is the patient's perception of their health status since discharge?  Improving   Is the patient/caregiver able to teach back signs and symptoms related to disease process for when to call PCP?  Yes   Is the patient/caregiver able to teach back signs and symptoms related to disease process for when to call 911?  Yes   Is the patient/caregiver able to teach back the hierarchy of who to call/visit for symptoms/problems? PCP, Specialist, Home health nurse, Urgent Care, ED, 911  Yes   Additional teach back comments  1200ml fluid restriction   Week 2 Call Completed?  Yes          Luli Mccormick RN

## 2018-09-25 ENCOUNTER — HOSPITAL ENCOUNTER (OUTPATIENT)
Dept: CT IMAGING | Facility: HOSPITAL | Age: 83
Discharge: HOME | End: 2018-09-25
Admitting: NURSE PRACTITIONER

## 2018-09-25 DIAGNOSIS — J18.9 PNEUMONIA OF LEFT LOWER LOBE DUE TO INFECTIOUS ORGANISM: ICD-10-CM

## 2018-09-25 DIAGNOSIS — J90 PLEURAL EFFUSION: ICD-10-CM

## 2018-09-25 DIAGNOSIS — J92.9 PLEURAL THICKENING: ICD-10-CM

## 2018-09-25 PROCEDURE — 71250 CT THORAX DX C-: CPT

## 2018-09-26 ENCOUNTER — READMISSION MANAGEMENT (OUTPATIENT)
Dept: CALL CENTER | Facility: HOSPITAL | Age: 83
End: 2018-09-26

## 2018-09-26 ENCOUNTER — OFFICE VISIT (OUTPATIENT)
Dept: FAMILY MEDICINE CLINIC | Facility: CLINIC | Age: 83
End: 2018-09-26

## 2018-09-26 VITALS
RESPIRATION RATE: 18 BRPM | DIASTOLIC BLOOD PRESSURE: 78 MMHG | HEART RATE: 71 BPM | SYSTOLIC BLOOD PRESSURE: 168 MMHG | WEIGHT: 156 LBS | HEIGHT: 67 IN | TEMPERATURE: 97.5 F | BODY MASS INDEX: 24.48 KG/M2 | OXYGEN SATURATION: 98 %

## 2018-09-26 DIAGNOSIS — R10.11 RUQ PAIN: ICD-10-CM

## 2018-09-26 DIAGNOSIS — E87.1 HYPONATREMIA: Primary | ICD-10-CM

## 2018-09-26 DIAGNOSIS — F41.9 ANXIETY: ICD-10-CM

## 2018-09-26 DIAGNOSIS — D64.9 ANEMIA, UNSPECIFIED TYPE: ICD-10-CM

## 2018-09-26 PROCEDURE — 99214 OFFICE O/P EST MOD 30 MIN: CPT | Performed by: NURSE PRACTITIONER

## 2018-09-26 RX ORDER — HYDROXYZINE HYDROCHLORIDE 25 MG/1
25 TABLET, FILM COATED ORAL 3 TIMES DAILY PRN
Qty: 30 TABLET | Refills: 0 | Status: SHIPPED | OUTPATIENT
Start: 2018-09-26

## 2018-09-26 RX ORDER — SUCRALFATE ORAL 1 G/10ML
1 SUSPENSION ORAL 4 TIMES DAILY
Qty: 420 ML | Refills: 0 | Status: SHIPPED | OUTPATIENT
Start: 2018-09-26

## 2018-09-26 NOTE — OUTREACH NOTE
"Medical Week 3 Survey      Responses   Facility patient discharged from?  [facility]   Does the patient have one of the following disease processes/diagnoses(primary or secondary)?  Other   Week 3 attempt successful?  Yes   Call start time  0854   Call end time  0900   Discharge diagnosis   Dehydration   Meds reviewed with patient/caregiver?  Yes   Is the patient taking all medications as directed (includes completed medication regime)?  Yes   Has the patient kept scheduled appointments due by today?  Yes   Additional teach back comments  Pt says he is no longer on fluid restriction and is drinking plenty of fluids. He says he is \"sick to my stomach\" a lot. Will be seeing Dr later today   Week 3 Call Completed?  Yes          Mirna Gutiérrez RN  "

## 2018-09-26 NOTE — PROGRESS NOTES
Subjective   Adriel Mackenzie is a 83 y.o. male.     History of Present Illness   Patient presents for f/u of SIADH.  He has since seen Dr. Ford with nephrology and had labs which showed normal sodium.  SIADH seems to have resolved and is determined to have been related to pneumonia. He was taken off fluid restriction per Dr. Ford. He has had some constipation issues which may be from fluid restriction.  His hgb was decreased to 10.4.  Patient states he still does not feel well. Reports mid to right upper abdomen pain that has been ongoing.  He denies nausea, vomiting, diarrhea or significant reflux.  He had CT chest yesterday ordered per Dr. Osorio that has not resulted yet.  Has f/u appt with Dr. Osorio on Friday.    The following portions of the patient's history were reviewed and updated as appropriate: allergies, current medications, past family history, past medical history, past social history, past surgical history and problem list.    Review of Systems   Constitutional: Positive for fatigue. Negative for chills and fever.   Respiratory: Positive for shortness of breath. Negative for cough.    Cardiovascular: Negative for chest pain and palpitations.   Skin: Negative for rash.   Psychiatric/Behavioral: Positive for sleep disturbance. Negative for dysphoric mood. The patient is nervous/anxious.        Objective   Physical Exam   Constitutional: He is oriented to person, place, and time. He appears well-developed and well-nourished.   Cardiovascular: Normal rate and regular rhythm.    Pulmonary/Chest: Effort normal. He has decreased breath sounds in the right lower field and the left lower field.   Neurological: He is oriented to person, place, and time.   Skin: Skin is warm and dry.   Psychiatric: He has a normal mood and affect. His behavior is normal. Judgment and thought content normal.   Nursing note and vitals reviewed.      Assessment/Plan   Adriel was seen today for abnormal lab.    Diagnoses  and all orders for this visit:    Hyponatremia  Comments:  resolved  Orders:  -     Basic metabolic panel    RUQ pain  -     sucralfate (CARAFATE) 1 GM/10ML suspension; Take 10 mL by mouth 4 (Four) Times a Day.  -     Ambulatory Referral to Gastroenterology    Anemia, unspecified type  -     sucralfate (CARAFATE) 1 GM/10ML suspension; Take 10 mL by mouth 4 (Four) Times a Day.  -     Ambulatory Referral to Gastroenterology  -     CBC No Differential    Anxiety  -     hydrOXYzine (ATARAX) 25 MG tablet; Take 1 tablet by mouth 3 (Three) Times a Day As Needed for Anxiety.             Refer to GI for workup of possible GI bleed as source of anemia.    Patient will f/u with Dr. Osorio as scheduled.   Will order repeat CBC and BMP in 4 weeks.

## 2018-09-27 ENCOUNTER — TELEPHONE (OUTPATIENT)
Dept: FAMILY MEDICINE CLINIC | Facility: CLINIC | Age: 83
End: 2018-09-27

## 2018-09-27 NOTE — TELEPHONE ENCOUNTER
CT results showed small hiatal hernia which is likely chronic, but no other abnormality in upper abdomen.  There are some acute changes in the lungs. I have forwarded the results to Dr. Osorio.  Verify that patient has appt with him tomorrow.

## 2018-09-27 NOTE — TELEPHONE ENCOUNTER
----- Message from LESLIE Bravo sent at 9/26/2018 12:37 PM EDT -----  Regarding: view CT results  Look for CT chest results.

## 2018-09-28 ENCOUNTER — TRANSCRIBE ORDERS (OUTPATIENT)
Dept: ADMINISTRATIVE | Facility: HOSPITAL | Age: 83
End: 2018-09-28

## 2018-09-28 DIAGNOSIS — R91.1 RIGHT UPPER LOBE PULMONARY NODULE: Primary | ICD-10-CM

## 2018-10-02 ENCOUNTER — OFFICE VISIT (OUTPATIENT)
Dept: SURGERY | Facility: CLINIC | Age: 83
End: 2018-10-02

## 2018-10-02 ENCOUNTER — HOSPITAL ENCOUNTER (OUTPATIENT)
Dept: PET IMAGING | Facility: HOSPITAL | Age: 83
Discharge: HOME | End: 2018-10-02
Attending: INTERNAL MEDICINE

## 2018-10-02 ENCOUNTER — HOSPITAL ENCOUNTER (OUTPATIENT)
Dept: PET IMAGING | Facility: HOSPITAL | Age: 83
Discharge: HOME | End: 2018-10-02
Attending: INTERNAL MEDICINE | Admitting: INTERNAL MEDICINE

## 2018-10-02 VITALS
SYSTOLIC BLOOD PRESSURE: 137 MMHG | WEIGHT: 156 LBS | HEIGHT: 67 IN | DIASTOLIC BLOOD PRESSURE: 77 MMHG | OXYGEN SATURATION: 97 % | HEART RATE: 70 BPM | BODY MASS INDEX: 24.48 KG/M2

## 2018-10-02 DIAGNOSIS — J90 PLEURAL EFFUSION: Primary | ICD-10-CM

## 2018-10-02 DIAGNOSIS — J92.9 PLEURAL THICKENING: ICD-10-CM

## 2018-10-02 DIAGNOSIS — R91.1 RIGHT UPPER LOBE PULMONARY NODULE: ICD-10-CM

## 2018-10-02 LAB — GLUCOSE BLDC GLUCOMTR-MCNC: 95 MG/DL (ref 70–130)

## 2018-10-02 PROCEDURE — 0 FLUDEOXYGLUCOSE F18 SOLUTION: Performed by: INTERNAL MEDICINE

## 2018-10-02 PROCEDURE — 78815 PET IMAGE W/CT SKULL-THIGH: CPT

## 2018-10-02 PROCEDURE — A9552 F18 FDG: HCPCS | Performed by: INTERNAL MEDICINE

## 2018-10-02 PROCEDURE — 99213 OFFICE O/P EST LOW 20 MIN: CPT | Performed by: THORACIC SURGERY (CARDIOTHORACIC VASCULAR SURGERY)

## 2018-10-02 PROCEDURE — 82962 GLUCOSE BLOOD TEST: CPT

## 2018-10-02 RX ADMIN — FLUDEOXYGLUCOSE F18 1 DOSE: 300 INJECTION INTRAVENOUS at 10:31

## 2018-10-02 NOTE — PROGRESS NOTES
Subjective   Patient ID: Adriel Mackenzie is a 83 y.o. male is here today for follow-up.    History of Present Illness  Dear Colleague,  Adriel Mackenzie was seen in our office today for further follow-up of the pleural effusion and pleural thickening.  Patient was recently hospitalized with pneumonia.  CAT scan showed loculated pleural effusion and pleural thickening.  It was felt that this all represented response to the infectious process and we elected to just follow him conservatively.  He returns today with a repeat CT of the chest.  He has had no fever chills or night sweats.  He is occasionally awakened at night coughing and choking.  He does not have a cough during the day.  He does not have hemoptysis or purulent sputum production.  He denies pleuritic pain.  His appetite is poor. He has lost approximately 10 pounds over the last 6 weeks.  He has a long history of gastroesophageal reflux disease.     The following portions of the patient's history were reviewed and updated as appropriate: allergies, current medications, past family history, past medical history, past social history, past surgical history and problem list.  Review of Systems   Constitution: Positive for malaise/fatigue.   HENT: Negative.    Eyes: Negative.    Cardiovascular: Negative.    Respiratory: Positive for shortness of breath.    Endocrine: Negative.    Hematologic/Lymphatic: Negative.    Skin: Negative.    Musculoskeletal: Negative.    Gastrointestinal: Negative.    Genitourinary: Negative.    Neurological: Negative.    Psychiatric/Behavioral: Negative.      Patient Active Problem List   Diagnosis   • GERD (gastroesophageal reflux disease)   • Hyperlipidemia   • Pleural effusion   • History of coronary artery stent placement   • Essential hypertension   • Pneumonia of left lower lobe due to infectious organism (CMS/HCC)   • Hyponatremia   • Pleural thickening   • SIADH (syndrome of inappropriate ADH production) (CMS/HCC)   •  Acute deep vein thrombosis (DVT) of distal vein of both lower extremities (CMS/HCC)   • Dehydration   • Ketonuria     Past Medical History:   Diagnosis Date   • BCC (basal cell carcinoma)    • Chronic bronchitis (CMS/HCC)    • Coronary artery disease    • GERD (gastroesophageal reflux disease)    • H/O complete eye exam 2016    scheduled   • History of coronary artery stent placement    • Hyperlipidemia    • Pleural effusion    • PVD (peripheral vascular disease) (CMS/HCC)      Past Surgical History:   Procedure Laterality Date   • ATHRECTOMY ILIAC, FEMORAL, TIBIAL ARTERY Right 3/8/2016    Procedure: AIF W/ RT LEG RUN OFF AND LASER FEM POP ATHRECTOMY ;  Surgeon: Alex Olguin MD;  Location: [facility] OR 18/19;  Service:    • CATARACT EXTRACTION Bilateral 09/2014   • COLONOSCOPY     • INGUINAL HERNIA REPAIR Right    • JOINT REPLACEMENT Right 03/11/2014    hip; Dr. Cowan   • TOTAL HIP ARTHROPLASTY Right    • TOTAL KNEE ARTHROPLASTY Right      Family History   Problem Relation Age of Onset   • Cancer Father    • Alcohol abuse Brother      Social History     Social History   • Marital status:      Spouse name: N/A   • Number of children: N/A   • Years of education: N/A     Occupational History   • Not on file.     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   • Drug use: No   • Sexual activity: Defer     Other Topics Concern   • Not on file     Social History Narrative   • No narrative on file       Current Outpatient Prescriptions:   •  acetaminophen (TYLENOL) 325 MG tablet, Take 2 tablets by mouth Every 4 (Four) Hours As Needed for Mild Pain ., Disp: , Rfl:   •  albuterol (PROVENTIL HFA;VENTOLIN HFA) 108 (90 BASE) MCG/ACT inhaler, Inhale 2 puffs Every 4 (Four) Hours As Needed for Wheezing., Disp: 1 inhaler, Rfl: 6  •  amLODIPine (NORVASC) 5 MG tablet, Take 5 mg by mouth., Disp: , Rfl:   •  aspirin 81 MG  tablet, Take 81 mg by mouth every night., Disp: , Rfl:   •  budesonide-formoterol (SYMBICORT) 80-4.5 MCG/ACT inhaler, Inhale 2 puffs 2 (Two) Times a Day., Disp: 1 inhaler, Rfl: 5  •  clopidogrel (PLAVIX) 75 MG tablet, Take 75 mg by mouth Daily., Disp: , Rfl:   •  demeclocycline (DECLOMYCIN) 300 MG tablet, Take 1 tablet by mouth Every 12 (Twelve) Hours for 30 days., Disp: 60 tablet, Rfl: 0  •  esomeprazole (NexIUM) 40 MG capsule, Take 1 capsule by mouth every morning., Disp: , Rfl:   •  hydrOXYzine (ATARAX) 25 MG tablet, Take 1 tablet by mouth 3 (Three) Times a Day As Needed for Anxiety., Disp: 30 tablet, Rfl: 0  •  metoprolol succinate XL (TOPROL-XL) 25 MG 24 hr tablet, Take 25 mg by mouth daily., Disp: , Rfl:   •  rosuvastatin (CRESTOR) 5 MG tablet, , Disp: , Rfl: 2  •  sucralfate (CARAFATE) 1 GM/10ML suspension, Take 10 mL by mouth 4 (Four) Times a Day., Disp: 420 mL, Rfl: 0  No current facility-administered medications for this visit.   Allergies   Allergen Reactions   • Protamine Other (See Comments)     bradycardia        Objective   Vitals:    10/02/18 0954   BP: 137/77   Pulse: 70   SpO2: 97%     Physical Exam   Constitutional: He is oriented to person, place, and time. He appears well-developed and well-nourished.   HENT:   Head: Normocephalic.   Eyes: Pupils are equal, round, and reactive to light. Conjunctivae, EOM and lids are normal.   Neck: Trachea normal and normal range of motion. Neck supple. No hepatojugular reflux and no JVD present. Carotid bruit is not present. No thyroid mass and no thyromegaly present.   Cardiovascular: Normal rate, regular rhythm, S1 normal, S2 normal, normal heart sounds and normal pulses.   No extrasystoles are present. PMI is not displaced.    Pulmonary/Chest: Effort normal and breath sounds normal.   Abdominal: Soft. Normal appearance and bowel sounds are normal. He exhibits no mass. There is no hepatosplenomegaly. There is no tenderness. No hernia.   Musculoskeletal:  Normal range of motion.   Neurological: He is alert and oriented to person, place, and time. He has normal strength and normal reflexes. No cranial nerve deficit or sensory deficit. He displays a negative Romberg sign.   Skin: Skin is warm, dry and intact.   Psychiatric: He has a normal mood and affect. His speech is normal and behavior is normal. Judgment and thought content normal. Cognition and memory are normal.     Independent Review of Radiographic Studies:    CT of the chest performed September 25, 2018 was independently reviewed.  This was compared to previous x-rays.There are areas of consolidation seen  through the left lung, which are favored to represent areas of rounded  atelectasis. There is also extensive bronchial wall thickening seen  throughout the left lung. These findings are stable when compared to  August 2018. There are actually favored to be chronic, progressive  change. There is a loculated left pleural effusion, which appears stable  when compared to exam from August 2018. When compared to the August  exams, patient has developed patchy groundglass infiltrates throughout  the right lung, which are likely infectious or inflammatory. Some of the  patient's mediastinal lymph nodes have increased in the prior exam. For  example anterior mediastinal node now measures 3.1 x 1.4 cm, previously  3.5 x 1.5 cm on August 18. However a right hilar node has increased, now  measuring 2.3 x 2.0 cm, previously 1.6 x 1.4 cm on August 18. Right  paratracheal node also is probably larger, measuring 3.2 x 1.5 cm,  previously 2.6 x 1.6 cm. An additional nodule seen within the right  upper lobe measures about 1 cm in size. It is difficult to see on this  unenhanced examination, but does appear slightly smaller, and is favored  to represent a reactive node.      Assessment/Plan        Assessment: I believe that all of the findings we're seeing on the lungs can be related to gastroesophageal reflux disease with  nocturnal aspiration.  This would be a highly unusual presentation for cancer.  Dr. Osorio has ordered a CT PET scan to be done later this week.    Plan: I will review the CT PET scan and discuss the case further with Dr. Osorio.  Once we have formulated a plan I will be back in touch with the patient.    Diagnoses and all orders for this visit:    Pleural effusion    Pleural thickening

## 2018-10-05 ENCOUNTER — OFFICE VISIT (OUTPATIENT)
Dept: GASTROENTEROLOGY | Facility: CLINIC | Age: 83
End: 2018-10-05

## 2018-10-05 ENCOUNTER — READMISSION MANAGEMENT (OUTPATIENT)
Dept: CALL CENTER | Facility: HOSPITAL | Age: 83
End: 2018-10-05

## 2018-10-05 ENCOUNTER — TELEPHONE (OUTPATIENT)
Dept: SURGERY | Facility: CLINIC | Age: 83
End: 2018-10-05

## 2018-10-05 VITALS
DIASTOLIC BLOOD PRESSURE: 72 MMHG | WEIGHT: 151.2 LBS | TEMPERATURE: 97.7 F | BODY MASS INDEX: 23.73 KG/M2 | HEIGHT: 67 IN | SYSTOLIC BLOOD PRESSURE: 132 MMHG

## 2018-10-05 DIAGNOSIS — D50.8 OTHER IRON DEFICIENCY ANEMIA: Primary | ICD-10-CM

## 2018-10-05 PROCEDURE — 99204 OFFICE O/P NEW MOD 45 MIN: CPT | Performed by: INTERNAL MEDICINE

## 2018-10-05 NOTE — OUTREACH NOTE
Medical Week 4 Survey      Responses   Facility patient discharged from?  [facility]   Does the patient have one of the following disease processes/diagnoses(primary or secondary)?  Other   Week 4 attempt successful?  No          Doris Mcqueen RN

## 2018-10-05 NOTE — TELEPHONE ENCOUNTER
----- Message from Shonda Velazquez MA sent at 10/4/2018  8:35 AM EDT -----  Pt PET is not final just yet however the images are pretty bad, Mr Haji daughter called this morning and asked about this scan,  she would like to be notified of results before the PT so that she can prepare the family of results. She is on his HIPPA form. Her name is Shirlene phone is [phone]. Thank you      I called Mr. Mackenzie's daughter today to discuss the results of the PET scan.  This gentleman appears to have stage IV lung cancer with metastases to the thoracic spine as well as to cervical lymph nodes with multiple pulmonary nodules.  I recommended referral to our multidisciplinary thoracic oncology clinic so that a biopsy can be obtained for tissue diagnosis and referrals can be made to medical oncology and radiation oncology.  Mr. Mackenzie's wife was treated recently by Dr. Sarabia at [facility] oncology and he had a good relationship with Dr. Sarabia. Mr Mackenzie would like to be seen by Dr. Sarabia.  I assured his daughter that that would be perfectly acceptable and would be glad to assist in that referral.  Mr. Mackenzie has an appointment to see Dr. Osorio on Monday and will discuss the situation with him.

## 2018-10-05 NOTE — PROGRESS NOTES
Chief Complaint   Patient presents with   • Abdominal Pain   • Anemia       Adriel Mackenzie is a 83 y.o. male who presents with iron deficiency anemia, weight loss, fatigue, back pain    83-year-old with recent admission for pneumonia, found to have iron deficiency anemia.  He is on Plavix for coronary stents and also lower extremity stents.  He been on Eliquis recently for DVT but he is off now.  PET scan ordered last week shows malignancy in the left and right lobe of lung, widespread metastatic bone disease.  Patient not aware of diagnosis.        Past Medical History:   Diagnosis Date   • BCC (basal cell carcinoma)    • Chronic bronchitis (CMS/HCC)    • Coronary artery disease    • GERD (gastroesophageal reflux disease)    • H/O complete eye exam 2016    scheduled   • History of coronary artery stent placement    • Hyperlipidemia    • Pleural effusion    • PVD (peripheral vascular disease) (CMS/HCC)        Past Surgical History:   Procedure Laterality Date   • ATHRECTOMY ILIAC, FEMORAL, TIBIAL ARTERY Right 3/8/2016    Procedure: AIF W/ RT LEG RUN OFF AND LASER FEM POP ATHRECTOMY ;  Surgeon: Alex Olguin MD;  Location: [facility] OR 18/19;  Service:    • CATARACT EXTRACTION Bilateral 09/2014   • COLONOSCOPY     • INGUINAL HERNIA REPAIR Right    • JOINT REPLACEMENT Right 03/11/2014    hip; Dr. Cowan   • TOTAL HIP ARTHROPLASTY Right    • TOTAL KNEE ARTHROPLASTY Right          Current Outpatient Prescriptions:   •  acetaminophen (TYLENOL) 325 MG tablet, Take 2 tablets by mouth Every 4 (Four) Hours As Needed for Mild Pain ., Disp: , Rfl:   •  albuterol (PROVENTIL HFA;VENTOLIN HFA) 108 (90 BASE) MCG/ACT inhaler, Inhale 2 puffs Every 4 (Four) Hours As Needed for Wheezing., Disp: 1 inhaler, Rfl: 6  •  amLODIPine (NORVASC) 5 MG tablet, Take 5 mg by mouth., Disp: , Rfl:   •  aspirin 81 MG tablet, Take 81 mg by mouth every night., Disp: , Rfl:   •  budesonide-formoterol (SYMBICORT) 80-4.5 MCG/ACT inhaler,  Inhale 2 puffs 2 (Two) Times a Day., Disp: 1 inhaler, Rfl: 5  •  clopidogrel (PLAVIX) 75 MG tablet, Take 75 mg by mouth Daily., Disp: , Rfl:   •  esomeprazole (NexIUM) 40 MG capsule, Take 1 capsule by mouth every morning., Disp: , Rfl:   •  hydrOXYzine (ATARAX) 25 MG tablet, Take 1 tablet by mouth 3 (Three) Times a Day As Needed for Anxiety., Disp: 30 tablet, Rfl: 0  •  metoprolol succinate XL (TOPROL-XL) 25 MG 24 hr tablet, Take 25 mg by mouth daily., Disp: , Rfl:   •  rosuvastatin (CRESTOR) 5 MG tablet, , Disp: , Rfl: 2  •  sucralfate (CARAFATE) 1 GM/10ML suspension, Take 10 mL by mouth 4 (Four) Times a Day., Disp: 420 mL, Rfl: 0    Allergies   Allergen Reactions   • Protamine Other (See Comments)     bradycardia       Social History     Social History   • Marital status:      Spouse name: N/A   • Number of children: N/A   • Years of education: N/A     Occupational History   • Not on file.     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   All other systems reviewed and are negative.      Vitals:    10/05/18 0921   BP: 132/72   Temp: 97.7 °F (36.5 °C)       Physical Exam   Constitutional: He is oriented to person, place, and time. He appears well-developed and well-nourished.   HENT:   Head: Normocephalic and atraumatic.   Eyes: Conjunctivae and EOM are normal.   Neck: Normal range of motion. No tracheal deviation present.   Cardiovascular: Normal rate and regular rhythm.    Pulmonary/Chest: Effort normal and breath sounds normal. No respiratory  distress.   Abdominal: Soft. Bowel sounds are normal. He exhibits no distension and no mass. There is no tenderness. There is no rebound and no guarding.   Musculoskeletal: Normal range of motion.   Neurological: He is alert and oriented to person, place, and time.   Skin: Skin is warm and dry.   Psychiatric: He has a normal mood and affect. Judgment normal.   Nursing note and vitals reviewed.      No images are attached to the encounter.    Study Result     F-18 FDG PET FROM SKULL BASE TO MID THIGH WITH PET/CT FUSION     HISTORY: Lung nodule.      TECHNIQUE: Radiation dose reduction techniques were utilized, including  automated exposure control and exposure modulation based on body size.   Blood glucose level at time of injection was 95 mg/dL.  6.8 mCi of F-18  FDG were injected and PET was performed from skull base to mid thigh. CT  was obtained for localization and attenuation correction. Time at  injection 1031. PET start time 1159.       Compared with chest CT 09/25/2018.      FINDINGS:      Brain: No findings of intracranial pathology or abnormal FDG uptake.     Neck: There is bilateral cervical adenopathy demonstrating intense FDG  uptake with representative nodules as below:  *  Right posterior cervical (level Vb) measuring 0.9 cm with a maximum  SUV of 5.7.  *  Right lower cervical (level IV) conglomerate measuring 2.8 x 1.3 cm  with a maximum SUV 11.2.  *  Left supraclavicular lymph node measuring 1.2 cm in short axis  dimension with a maximum SUV of 12.     Chest: Intense FDG avid mediastinal, internal mammary and hilar  adenopathy is present with representative nodules as below:  *  Right anterior paratracheal conglomerate measuring 2.7 x 0.9 cm with  maximum SUV of 9.8.  *  Left internal mammary node measuring 1 cm in short axis dimension  with a maximum SUV of 6.7.     The pleural-based spiculated lesion within the left lower lobe measures  1.8 cm. There is masslike pulmonary opacification within the  right lower  lobe posteriorly which demonstrates moderate to intense FDG uptake with  a maximum SUV of 7.9. Peribronchiolar thickening is present within the  basilar portions of the left lower lobe. Loculated pleural effusions are  present within the left major fissure and along the left heart border  which are non-FDG avid.     There is a noncalcified pulmonary nodule within the suprahilar portion  of the right upper lobe measuring 1 cm with a maximum SUV of 6.3. A few  sub-6 mm noncalcified pulmonary nodules are scattered bilaterally and  below PET resolution.     There is a small pericardial effusion. Calcified pleural plaques are  present on the left.     Abdomen and pelvis:     The liver, spleen, pancreas, kidneys and adrenal glands have a normal  non contrast CT appearance and demonstrate physiologic FDG uptake.     The gallbladder is unremarkable.      The bowel demonstrates normal physiologic FDG uptake.     There are no FDG avid or enlarged abdominopelvic lymph nodes.     The bladder is unremarkable for its degree of distension.     There is a focus of intense uptake within the left scapula with a subtle  lucent lesion in this location on corresponding CT. Intensely FDG avid  foci are present within the bilateral 5th and right 10th ribs without  corresponding lesion seen on noncontrast CT. Focal uptake within the  right aspect of the T12 and left aspect of T8 are present with  corresponding lytic lesions seen on noncontrast CT. The lesion at T8  demonstrates cortical breakthrough with soft tissue extension into the  posterior mediastinum which abuts the posterior aspect of the aorta.  Large area of focal intense FDG uptake within the L3 vertebral body  without corresponding lesion seen on noncontrast CT.     Postsurgical changes from total right hip arthroplasty.     IMPRESSION:  1.  Pleural-based spiculated lesion within the left lower lobe  demonstrating intense FDG uptake likely representing malignancy.    2.  Intensely FDG avid adenopathy within lower neck and chest and  intensely FDG avid lesions within the axial and appendicular skeleton as  above, likely representing metastatic disease. Of note, the lesion  within the left aspect of T8 demonstrates cortical breakthrough with  soft tissue extension into the posterior mediastinum to abut the aorta.  3.  Intensely FDG avid noncalcified pulmonary nodule within the  suprahilar region of the right upper lobe likely representing  malignancy.  4.  Irregular pulmonary opacification within the right lower lobe  demonstrating moderate to intense FDG uptake with associated  peribronchiolar thickening involving the basilar portions of the left  lower lobe with partially loculated effusions, most suggestive of  coexistent pneumonia. Coexistent lymphangitic spread of malignancy could  appear similar, and close attention on follow-up is recommended.  5.  A few scattered sub-6 mm noncalcified pulmonary nodules bilaterally  which are below PET resolution. Attention on follow-up is recommended.  6.  Other findings as above.     This report was finalized on 10/4/2018 2:41 PM by Dr. Josias Guillaume M.D.     Problem list    Bilateral lung malignancy likely  Metastatic disease likely  Iron Deficiency anemia    Assessment/Plan    Certainly I'll will NOT move to upper and lower endoscopic evaluation for iron deficiency anemia in a patient with probable lung cancer with widespread metastatic disease  He will likely require oncology referral, possible hospice or palliative care consult  I have explained the findings of the PET scan to the patient and the daughter and I have told him to call me anytime if they have questions  They will be following up with Dr. Osorio for what is likely a metastatic lung cancer diagnosis on 10 8 2018